# Patient Record
Sex: MALE | Race: WHITE | Employment: UNEMPLOYED
[De-identification: names, ages, dates, MRNs, and addresses within clinical notes are randomized per-mention and may not be internally consistent; named-entity substitution may affect disease eponyms.]

---

## 2018-08-01 ENCOUNTER — HOSPITAL ENCOUNTER (OUTPATIENT)
Dept: RADIATION ONCOLOGY | Facility: MEDICAL CENTER | Age: 63
Discharge: HOME | End: 2018-08-01
Payer: COMMERCIAL

## 2018-08-01 PROCEDURE — 99213 OFFICE O/P EST LOW 20 MIN: CPT | Performed by: RADIOLOGY

## 2018-08-08 PROCEDURE — 77290 THER RAD SIMULAJ FIELD CPLX: CPT | Performed by: RADIOLOGY

## 2018-08-08 PROCEDURE — 77334 RADIATION TREATMENT AID(S): CPT | Performed by: RADIOLOGY

## 2018-08-13 PROCEDURE — 77334 RADIATION TREATMENT AID(S): CPT | Performed by: RADIOLOGY

## 2018-08-13 PROCEDURE — 77300 RADIATION THERAPY DOSE PLAN: CPT | Performed by: RADIOLOGY

## 2018-08-13 PROCEDURE — 77295 3-D RADIOTHERAPY PLAN: CPT | Performed by: RADIOLOGY

## 2018-08-14 PROCEDURE — 77387 GUIDANCE FOR RADJ TX DLVR: CPT | Performed by: RADIOLOGY

## 2018-08-14 PROCEDURE — 77412 RADIATION TX DELIVERY LVL 3: CPT | Performed by: RADIOLOGY

## 2018-08-14 PROCEDURE — 77280 THER RAD SIMULAJ FIELD SMPL: CPT | Performed by: RADIOLOGY

## 2018-08-15 PROCEDURE — 77387 GUIDANCE FOR RADJ TX DLVR: CPT | Performed by: RADIOLOGY

## 2018-08-15 PROCEDURE — 77412 RADIATION TX DELIVERY LVL 3: CPT | Performed by: RADIOLOGY

## 2018-08-16 PROCEDURE — 77412 RADIATION TX DELIVERY LVL 3: CPT | Performed by: RADIOLOGY

## 2018-08-16 PROCEDURE — 77387 GUIDANCE FOR RADJ TX DLVR: CPT | Performed by: RADIOLOGY

## 2018-08-17 PROCEDURE — 77387 GUIDANCE FOR RADJ TX DLVR: CPT | Performed by: RADIOLOGY

## 2018-08-17 PROCEDURE — 77412 RADIATION TX DELIVERY LVL 3: CPT | Performed by: RADIOLOGY

## 2018-08-20 PROCEDURE — 77412 RADIATION TX DELIVERY LVL 3: CPT | Performed by: RADIOLOGY

## 2018-08-20 PROCEDURE — 77336 RADIATION PHYSICS CONSULT: CPT | Performed by: RADIOLOGY

## 2018-08-20 PROCEDURE — 77387 GUIDANCE FOR RADJ TX DLVR: CPT | Performed by: RADIOLOGY

## 2018-08-21 PROCEDURE — 77412 RADIATION TX DELIVERY LVL 3: CPT | Performed by: RADIOLOGY

## 2018-08-21 PROCEDURE — 77387 GUIDANCE FOR RADJ TX DLVR: CPT | Performed by: RADIOLOGY

## 2018-08-22 PROCEDURE — 77387 GUIDANCE FOR RADJ TX DLVR: CPT | Performed by: RADIOLOGY

## 2018-08-22 PROCEDURE — 77412 RADIATION TX DELIVERY LVL 3: CPT | Performed by: RADIOLOGY

## 2018-08-23 PROCEDURE — 77412 RADIATION TX DELIVERY LVL 3: CPT | Performed by: RADIOLOGY

## 2018-08-23 PROCEDURE — 77387 GUIDANCE FOR RADJ TX DLVR: CPT | Performed by: RADIOLOGY

## 2018-08-24 PROCEDURE — 77387 GUIDANCE FOR RADJ TX DLVR: CPT | Performed by: RADIOLOGY

## 2018-08-24 PROCEDURE — 77412 RADIATION TX DELIVERY LVL 3: CPT | Performed by: RADIOLOGY

## 2018-08-27 PROCEDURE — 77336 RADIATION PHYSICS CONSULT: CPT | Performed by: RADIOLOGY

## 2018-08-27 PROCEDURE — 77412 RADIATION TX DELIVERY LVL 3: CPT | Performed by: RADIOLOGY

## 2018-08-27 PROCEDURE — 77387 GUIDANCE FOR RADJ TX DLVR: CPT | Performed by: RADIOLOGY

## 2018-08-28 PROCEDURE — 77412 RADIATION TX DELIVERY LVL 3: CPT | Performed by: RADIOLOGY

## 2018-08-28 PROCEDURE — 77387 GUIDANCE FOR RADJ TX DLVR: CPT | Performed by: RADIOLOGY

## 2018-08-29 PROCEDURE — 77412 RADIATION TX DELIVERY LVL 3: CPT | Performed by: RADIOLOGY

## 2018-08-29 PROCEDURE — 77387 GUIDANCE FOR RADJ TX DLVR: CPT | Performed by: RADIOLOGY

## 2018-08-30 PROCEDURE — 77387 GUIDANCE FOR RADJ TX DLVR: CPT | Performed by: RADIOLOGY

## 2018-08-30 PROCEDURE — 77412 RADIATION TX DELIVERY LVL 3: CPT | Performed by: RADIOLOGY

## 2018-08-31 PROCEDURE — 77387 GUIDANCE FOR RADJ TX DLVR: CPT | Performed by: RADIOLOGY

## 2018-08-31 PROCEDURE — 77412 RADIATION TX DELIVERY LVL 3: CPT | Performed by: RADIOLOGY

## 2018-09-04 ENCOUNTER — HOSPITAL ENCOUNTER (OUTPATIENT)
Dept: RADIATION ONCOLOGY | Facility: MEDICAL CENTER | Age: 63
Discharge: HOME | End: 2018-09-04
Payer: COMMERCIAL

## 2018-09-04 PROCEDURE — 77412 RADIATION TX DELIVERY LVL 3: CPT | Performed by: RADIOLOGY

## 2018-09-04 PROCEDURE — 77387 GUIDANCE FOR RADJ TX DLVR: CPT | Performed by: RADIOLOGY

## 2018-09-04 PROCEDURE — 77336 RADIATION PHYSICS CONSULT: CPT | Performed by: RADIOLOGY

## 2018-09-05 PROCEDURE — 77387 GUIDANCE FOR RADJ TX DLVR: CPT | Performed by: RADIOLOGY

## 2018-09-05 PROCEDURE — 77412 RADIATION TX DELIVERY LVL 3: CPT | Performed by: RADIOLOGY

## 2018-09-06 PROCEDURE — 77412 RADIATION TX DELIVERY LVL 3: CPT | Performed by: RADIOLOGY

## 2018-09-06 PROCEDURE — 77387 GUIDANCE FOR RADJ TX DLVR: CPT | Performed by: RADIOLOGY

## 2018-09-07 PROCEDURE — 77412 RADIATION TX DELIVERY LVL 3: CPT | Performed by: RADIOLOGY

## 2018-09-07 PROCEDURE — 77387 GUIDANCE FOR RADJ TX DLVR: CPT | Performed by: RADIOLOGY

## 2018-09-10 PROCEDURE — 77412 RADIATION TX DELIVERY LVL 3: CPT | Performed by: RADIOLOGY

## 2018-09-10 PROCEDURE — 77387 GUIDANCE FOR RADJ TX DLVR: CPT | Performed by: RADIOLOGY

## 2018-09-11 PROCEDURE — 77387 GUIDANCE FOR RADJ TX DLVR: CPT | Performed by: RADIOLOGY

## 2018-09-11 PROCEDURE — 77412 RADIATION TX DELIVERY LVL 3: CPT | Performed by: RADIOLOGY

## 2018-09-11 PROCEDURE — 77336 RADIATION PHYSICS CONSULT: CPT | Performed by: RADIOLOGY

## 2018-09-12 PROCEDURE — 77412 RADIATION TX DELIVERY LVL 3: CPT | Performed by: RADIOLOGY

## 2018-09-12 PROCEDURE — 77387 GUIDANCE FOR RADJ TX DLVR: CPT | Performed by: RADIOLOGY

## 2018-09-13 PROCEDURE — 77412 RADIATION TX DELIVERY LVL 3: CPT | Performed by: RADIOLOGY

## 2018-09-13 PROCEDURE — 77387 GUIDANCE FOR RADJ TX DLVR: CPT | Performed by: RADIOLOGY

## 2018-09-14 PROCEDURE — 77387 GUIDANCE FOR RADJ TX DLVR: CPT | Performed by: RADIOLOGY

## 2018-09-14 PROCEDURE — 77412 RADIATION TX DELIVERY LVL 3: CPT | Performed by: RADIOLOGY

## 2018-09-17 PROCEDURE — 77387 GUIDANCE FOR RADJ TX DLVR: CPT | Performed by: RADIOLOGY

## 2018-09-17 PROCEDURE — 77412 RADIATION TX DELIVERY LVL 3: CPT | Performed by: RADIOLOGY

## 2018-09-18 PROCEDURE — 77387 GUIDANCE FOR RADJ TX DLVR: CPT | Performed by: RADIOLOGY

## 2018-09-18 PROCEDURE — 77336 RADIATION PHYSICS CONSULT: CPT | Performed by: RADIOLOGY

## 2018-09-18 PROCEDURE — 77412 RADIATION TX DELIVERY LVL 3: CPT | Performed by: RADIOLOGY

## 2019-01-21 PROBLEM — C34.32 PRIMARY MALIGNANT NEOPLASM OF LEFT LOWER LOBE OF LUNG (HCC): Status: ACTIVE | Noted: 2019-01-21

## 2019-01-22 ENCOUNTER — HOSPITAL ENCOUNTER (OUTPATIENT)
Dept: RADIATION ONCOLOGY | Facility: MEDICAL CENTER | Age: 64
Setting detail: THERAPIES SERIES
Discharge: HOME | End: 2019-01-22
Payer: COMMERCIAL

## 2019-01-22 VITALS
TEMPERATURE: 98.2 F | DIASTOLIC BLOOD PRESSURE: 71 MMHG | SYSTOLIC BLOOD PRESSURE: 114 MMHG | HEIGHT: 69 IN | WEIGHT: 163 LBS | HEART RATE: 101 BPM | BODY MASS INDEX: 24.14 KG/M2 | RESPIRATION RATE: 14 BRPM

## 2019-01-22 DIAGNOSIS — C34.32 PRIMARY MALIGNANT NEOPLASM OF LEFT LOWER LOBE OF LUNG (HCC): Primary | ICD-10-CM

## 2019-01-22 PROCEDURE — 99212 OFFICE O/P EST SF 10 MIN: CPT | Performed by: RADIOLOGY

## 2019-01-22 RX ORDER — ASCORBIC ACID 250 MG
250 TABLET ORAL
Status: ON HOLD | COMMUNITY
End: 2021-04-20 | Stop reason: ALTCHOICE

## 2019-01-22 RX ORDER — TRAMADOL HYDROCHLORIDE 50 MG/1
TABLET ORAL
Status: ON HOLD | COMMUNITY
Start: 2018-10-30 | End: 2021-04-20 | Stop reason: CLARIF

## 2019-01-22 RX ORDER — PANTOPRAZOLE SODIUM 40 MG/1
40 TABLET, DELAYED RELEASE ORAL
Status: ON HOLD | COMMUNITY
Start: 2019-01-08 | End: 2021-04-20 | Stop reason: SDUPTHER

## 2019-01-22 RX ORDER — FLUTICASONE PROPIONATE 50 MCG
2 SPRAY, SUSPENSION (ML) NASAL DAILY
COMMUNITY
Start: 2017-09-06

## 2019-01-22 RX ORDER — CLOPIDOGREL BISULFATE 75 MG/1
75 TABLET ORAL DAILY
COMMUNITY
Start: 2018-12-11

## 2019-01-22 RX ORDER — ACETAMINOPHEN 500 MG
500 TABLET ORAL 3 TIMES DAILY
COMMUNITY

## 2019-01-22 RX ORDER — CETIRIZINE HYDROCHLORIDE 10 MG/1
10 TABLET, CHEWABLE ORAL DAILY
COMMUNITY

## 2019-01-22 RX ORDER — CILOSTAZOL 50 MG/1
50 TABLET ORAL
Status: ON HOLD | COMMUNITY
End: 2021-04-20

## 2019-01-22 RX ORDER — GUAIFENESIN AND CODEINE PHOSPHATE 100; 10 MG/5ML; MG/5ML
5 SOLUTION ORAL
Status: ON HOLD | COMMUNITY
Start: 2019-01-02 | End: 2021-04-20

## 2019-05-16 ENCOUNTER — TELEPHONE (OUTPATIENT)
Dept: RADIATION ONCOLOGY | Facility: MEDICAL CENTER | Age: 64
End: 2019-05-16

## 2019-05-16 NOTE — TELEPHONE ENCOUNTER
Per pending list, I got results of Ct chest from [facility]. Report given to Dr Anna Michaud. Per his instructions I called Narinder to schedule follow up.  I spoke with his wife and scheduled follow up on 6/4/19 @ 3:15pm

## 2019-06-04 ENCOUNTER — HOSPITAL ENCOUNTER (OUTPATIENT)
Dept: RADIATION ONCOLOGY | Facility: MEDICAL CENTER | Age: 64
Discharge: HOME | End: 2019-06-04
Payer: COMMERCIAL

## 2019-06-04 VITALS
DIASTOLIC BLOOD PRESSURE: 70 MMHG | WEIGHT: 177 LBS | HEART RATE: 85 BPM | SYSTOLIC BLOOD PRESSURE: 105 MMHG | TEMPERATURE: 98.2 F | RESPIRATION RATE: 16 BRPM | BODY MASS INDEX: 26.22 KG/M2 | OXYGEN SATURATION: 95 % | HEIGHT: 69 IN

## 2019-06-04 PROCEDURE — 99212 OFFICE O/P EST SF 10 MIN: CPT | Performed by: RADIOLOGY

## 2019-06-04 RX ORDER — ALBUTEROL SULFATE 1.25 MG/3ML
1 SOLUTION RESPIRATORY (INHALATION) 2 TIMES DAILY
Status: ON HOLD | COMMUNITY
End: 2021-04-20 | Stop reason: CLARIF

## 2019-06-04 NOTE — PROGRESS NOTES
TABLET BY MOUTH EVERY 6 HOURS AS NEEDED FOR PAIN, Disp: , Rfl:     cilostazol (PLETAL) 50 MG tablet, Take 50 mg by mouth, Disp: , Rfl:     Immunizations:    Influenza status:    [x]   Current   []   Patient declined    Pneumococcal status:  [x]   Current  []   Patient declined    Smoking Status:    [] Smoker - PPD:   [x] Nonsmoker - Quit Date:   March 13 2018            [] Never a smoker           Cancer Screening:  Colonoscopy [x] Current [] Not current   [] Not current, but scheduled   [] NA  Mammogram [] Current [] Not current   [] Not current, but scheduled   [x] NA  Prostate [x] Current [] Not current   [] Not current, but scheduled   [] NA  PAP/Pelvic [] Current [] Not current   [] Not current, but scheduled   [x] NA  Skin  [] Current  [x] Not current   [] Not current, but scheduled   [] NA    Hormone Injection:  Lupron []   Last dose given:           Next dose due:   Eligard []   Last dose given:           Next dose due:   N/A:  [x]         FALLS RISK SCREEN  Instructions:  Assess the patient and enter the appropriate indicators that are present for fall risk identification. Total the numbers entered and assign a fall risk score from Table 2.  Reassess patient at a minimum every 12 weeks or with status change. Assessment   Date  6/4/2019   1. Mental Ability: confusion/cognitively impaired 0 (3)   2. Elimination Issues: incontinence, frequency 0 (3)   3. Ambulatory: use of assistive devices (walker, cane, off-loading devices),        attached to equipment (IV pole, oxygen) 0 (2)   4.  Sensory Limitations: dizziness, vertigo, impaired vision 0 (3)   5. Age less than 65                 0 (0)   6. Age 72 or greater 0 (1)   7. Medication: diuretics, strong analgesics, hypnotics, sedatives,        antihypertensive agents 0 (3)   8. Falls:  recent history of falls within the last 3 months (not to include slipping or        tripping) 0 (7)   TOTAL 0  If score of 4 or greater was education given?  No

## 2019-06-04 NOTE — PROGRESS NOTES
Martha Pennington M.D. Chitra Jerry. Phan Jackson, Ph.D., M.D., Anibal Kaba M.D. Parish Nowak, Ph.D., M.KORIN. Bairon Jones M.D. Date of Service: 2019    Location:  [facility],   [address] [place] [address], [facility]   [facility] FOLLOW UP    Patient ID:   Jo-Ann Ling  : 1955   MRN: [id number]    DIAGNOSIS:  Cancer Staging  Primary malignant neoplasm of left lower lobe of lung [facility])  Staging form: Lung, AJCC 8th Edition  - Pathologic stage from 1/3/2018: Stage IIB (pT1c, pN1, cM0) - Signed by Martha Pennington MD on 2019  Staging comments: Left lower lobe FNA 1/3/2018 positive grade 3 non-small cell carcinoma. Robotic left lower lobe lobectomy with mediastinal node dissection 3/13/2018 revealed 2.5 cm grade 3 squamous cell carcinoma, one peribronchial lymph nodes positive measuring 3.5 cm with extensive extracapsular extension with invasion of the bronchial/vascular invasion through the bronchial cartilage and into the subepithelial tissue without involving the mucosa, negative margins, evidence of lymphovascular invasion, 5 other peribronchial nodes negative, one/5 segmental nodes positive, one subsegmental node negative, one level X nodes negative, one hilar node negative, one left inferior pulmonary lymph nodes negative. Adjuvant carboplatin/Taxol followed by radiation to the left hilar tumor bed 5000 cGy between 2018 and 2018. INTERVAL HISTORY:   Jo-Ann Ling is a 59 y.o. male who underwent adjuvant radiation therapy following left lower lobectomy. A total dose delivery to the left hilar bed of 5000 cGy was completed by 2018. He had adjuvant carboplatin and Taxol followed by external beam radiation. Last seen in radiation oncology clinic 2019 he had a cough producing clear sputum that did not respond to antibiotics.  Prednisone was provided for presumptive radiation pneumonitis. He is on a tapering dose, now down to 10 mg per day. Imaging studies of the chest were most recently accomplished on May 8, 2019. The CT scan report from the [facility] clinic describes a comparison to studies performed February 7, 2019. This most recent study described volume loss in the left lobe of the lung to be stable guarding. Patchy densities in the right lower lobe had cleared, right middle lobe changes were stable, unrelated to the radiation treatment. Chronic changes in his lungs were also identified; these images are not available for my review. Overall he has a sense of well-being, occasional epistaxis which he believes is secondary to seasonal allergies and being treated with OTC medications. MEDICATIONS:    Current Outpatient Medications:     albuterol (ACCUNEB) 1.25 MG/3ML nebulizer solution, Inhale 1 ampule into the lungs 2 times daily, Disp: , Rfl:     B Complex Vitamins (VITAMIN B COMPLEX PO), Take 1 capsule by mouth, Disp: , Rfl:     cetirizine (ZYRTEC) 10 MG chewable tablet, Take 10 mg by mouth, Disp: , Rfl:     clopidogrel (PLAVIX) 75 MG tablet, TAKE 1 TABLET (75 MG TOTAL) BY MOUTH DAILY. , Disp: , Rfl:     Ferrous Fum-Iron Polysacch 162-115.2 MG CAPS, Take 1 capsule by mouth, Disp: , Rfl:     Multiple Vitamins-Minerals (MULTIVITAL PO), Take 1 tablet by mouth, Disp: , Rfl:     pantoprazole (PROTONIX) 40 MG tablet, TAKE 1 TABLET BY MOUTH EVERY DAY, Disp: , Rfl:     Omega-3 Fatty Acids (FISH OIL PO), Take 2 g by mouth, Disp: , Rfl:     acetaminophen (TYLENOL) 500 MG tablet, Take 500 mg by mouth, Disp: , Rfl:     ascorbic acid (VITAMIN C) 250 MG tablet, Take 250 mg by mouth, Disp: , Rfl:     guaiFENesin-codeine (TUSSI-ORGANIDIN NR) 100-10 MG/5ML syrup, Take 5 mLs by mouth. ., Disp: , Rfl:     fluticasone (FLONASE) 50 MCG/ACT nasal spray, 2 sprays by Nasal route, Disp: , Rfl:     glycopyrrolate-formoterol (BEVESPI AEROSPHERE) 9-4.8 MCG/ACT AERO, Inhale 2 puffs into the lungs, Disp: , Rfl:     traMADol (ULTRAM) 50 MG tablet, TAKE 1 TABLET BY MOUTH EVERY 6 HOURS AS NEEDED FOR PAIN, Disp: , Rfl:     cilostazol (PLETAL) 50 MG tablet, Take 50 mg by mouth, Disp: , Rfl:     ALLERGIES:  Allergies   Allergen Reactions    Adhesive Tape Other (See Comments)     redness    Sulfa Antibiotics      unsure       IMMUNIZATIONS:  Immunization History   Administered Date(s) Administered    Influenza Virus Vaccine 10/29/2018    Pneumococcal 13-valent Conjugate ([id number]) 2018    Pneumococcal Polysaccharide ([id number]) 2016       SMOKING:  Social History     Tobacco Use   Smoking Status Former Smoker    Last attempt to quit: 3/12/2018    Years since quittin.2   Smokeless Tobacco Never Used     Counseling given: Not Answered          REVIEW OF SYSTEMS:    Review of Systems unremarkable for progressive shortness of breath, productive cough, hemoptysis, chest pain, change in bowel or bladder habits, bone discomfort or pain. The remaining oncologic review of systems was noted and entered into the medical record after my review. PHYSICAL EXAMINATION:  CHAPERONE: Declined  ECO Asymptomatic  VITAL SIGNS: /70   Pulse 85   Temp 98.2 °F (36.8 °C) (Oral)   Resp 16   Ht 5' 8.5\" (1.74 m)   Wt 177 lb (80.3 kg)   SpO2 95%   BMI 26.52 kg/m²   Wt Readings from Last 3 Encounters:   19 177 lb (80.3 kg)   19 163 lb (73.9 kg)     Physical Exam was done in the presence of his wife. There is no lymphadenopathy in the cervical, supraclavicular, infraclavicular regions, his right lung is clear to auscultation, left lung is clear with somewhat distant breath sounds. Good skin turgor is present over the chest wall, heart regular rate and rhythm, abdomen is soft with brisk bowel sounds, no peripheral edema is noted, no tenderness to percussion over spine, he was seen to ambulate without unilateral weakness.     ASSESSMENT AND PLAN:  No reported radiographic evidence of progressive disease now approaching 9 months out of adjuvant radiation treatment, clinically doing well. He is returning to Dr. Heloise Bloch in November of this year has close follow-up in pulmonary clinic as well. I offered follow-up in our clinic in 9 months time or earlier should new problems arise. Electronically signed by Amy Schmidt MD on 6/4/2019 at 3:46 PM  [address]      Medications Prescribed:   New Prescriptions    No medications on file       Orders: No orders of the defined types were placed in this encounter.

## 2020-01-14 ENCOUNTER — TELEPHONE (OUTPATIENT)
Dept: RADIATION ONCOLOGY | Facility: MEDICAL CENTER | Age: 65
End: 2020-01-14

## 2020-01-14 NOTE — TELEPHONE ENCOUNTER
Alma Zamudio called to cancel his follow up on 3/24/2020.  He states he does not want to reschedule , he will follow with his medical oncologist.

## 2021-04-19 ENCOUNTER — APPOINTMENT (OUTPATIENT)
Dept: CT IMAGING | Age: 66
DRG: 378 | End: 2021-04-19
Payer: MEDICARE

## 2021-04-19 ENCOUNTER — HOSPITAL ENCOUNTER (INPATIENT)
Age: 66
LOS: 1 days | Discharge: HOME | DRG: 378 | End: 2021-04-20
Attending: EMERGENCY MEDICINE | Admitting: INTERNAL MEDICINE
Payer: MEDICARE

## 2021-04-19 DIAGNOSIS — N28.89 KIDNEY MASS: ICD-10-CM

## 2021-04-19 DIAGNOSIS — E87.1 HYPONATREMIA: ICD-10-CM

## 2021-04-19 DIAGNOSIS — K62.5 RECTAL BLEEDING: Primary | ICD-10-CM

## 2021-04-19 PROBLEM — K21.9 CHRONIC GERD: Status: ACTIVE | Noted: 2021-04-19

## 2021-04-19 PROBLEM — J44.9 COPD WITHOUT EXACERBATION (HCC): Status: ACTIVE | Noted: 2021-04-19

## 2021-04-19 PROBLEM — K92.2 GI BLEED: Status: ACTIVE | Noted: 2021-04-19

## 2021-04-19 PROBLEM — D50.9 MICROCYTIC ANEMIA: Status: ACTIVE | Noted: 2021-04-19

## 2021-04-19 LAB
ABSOLUTE EOS #: 0.07 K/UL (ref 0–0.44)
ABSOLUTE IMMATURE GRANULOCYTE: 0.06 K/UL (ref 0–0.3)
ABSOLUTE LYMPH #: 0.94 K/UL (ref 1.1–3.7)
ABSOLUTE MONO #: 0.81 K/UL (ref 0.1–1.2)
ALBUMIN SERPL-MCNC: 3.8 G/DL (ref 3.5–5.2)
ALBUMIN/GLOBULIN RATIO: ABNORMAL (ref 1–2.5)
ALP BLD-CCNC: 82 U/L (ref 40–129)
ALT SERPL-CCNC: 12 U/L (ref 5–41)
ANION GAP SERPL CALCULATED.3IONS-SCNC: 15 MMOL/L (ref 9–17)
AST SERPL-CCNC: 13 U/L
BASOPHILS # BLD: 0 % (ref 0–2)
BASOPHILS ABSOLUTE: 0.05 K/UL (ref 0–0.2)
BILIRUB SERPL-MCNC: 0.21 MG/DL (ref 0.3–1.2)
BUN BLDV-MCNC: 13 MG/DL (ref 8–23)
BUN/CREAT BLD: 19 (ref 9–20)
CALCIUM SERPL-MCNC: 9.3 MG/DL (ref 8.6–10.4)
CHLORIDE BLD-SCNC: 93 MMOL/L (ref 98–107)
CO2: 20 MMOL/L (ref 20–31)
CREAT SERPL-MCNC: 0.67 MG/DL (ref 0.7–1.2)
DATE, STOOL #1: NORMAL
DATE, STOOL #2: NORMAL
DATE, STOOL #3: NORMAL
DIFFERENTIAL TYPE: ABNORMAL
EOSINOPHILS RELATIVE PERCENT: 1 % (ref 1–4)
GFR AFRICAN AMERICAN: >60 ML/MIN
GFR NON-AFRICAN AMERICAN: >60 ML/MIN
GFR SERPL CREATININE-BSD FRML MDRD: ABNORMAL ML/MIN/{1.73_M2}
GFR SERPL CREATININE-BSD FRML MDRD: ABNORMAL ML/MIN/{1.73_M2}
GLUCOSE BLD-MCNC: 107 MG/DL (ref 70–99)
HCT VFR BLD CALC: 32.8 % (ref 40.7–50.3)
HEMOCCULT SP1 STL QL: NEGATIVE
HEMOCCULT SP2 STL QL: NORMAL
HEMOCCULT SP3 STL QL: NORMAL
HEMOGLOBIN: 9.8 G/DL (ref 13–17)
IMMATURE GRANULOCYTES: 1 %
LACTIC ACID: 0.7 MMOL/L (ref 0.5–2.2)
LIPASE: 19 U/L (ref 13–60)
LYMPHOCYTES # BLD: 8 % (ref 24–43)
MCH RBC QN AUTO: 22.5 PG (ref 25.2–33.5)
MCHC RBC AUTO-ENTMCNC: 29.9 G/DL (ref 28.4–34.8)
MCV RBC AUTO: 75.4 FL (ref 82.6–102.9)
MONOCYTES # BLD: 7 % (ref 3–12)
NRBC AUTOMATED: 0 PER 100 WBC
PDW BLD-RTO: 15.4 % (ref 11.8–14.4)
PLATELET # BLD: 404 K/UL (ref 138–453)
PLATELET ESTIMATE: ABNORMAL
PMV BLD AUTO: 8.2 FL (ref 8.1–13.5)
POTASSIUM SERPL-SCNC: 4.2 MMOL/L (ref 3.7–5.3)
RBC # BLD: 4.35 M/UL (ref 4.21–5.77)
RBC # BLD: ABNORMAL 10*6/UL
SEG NEUTROPHILS: 83 % (ref 36–65)
SEGMENTED NEUTROPHILS ABSOLUTE COUNT: 9.87 K/UL (ref 1.5–8.1)
SERUM OSMOLALITY: 268 MOSM/KG (ref 282–298)
SODIUM BLD-SCNC: 128 MMOL/L (ref 135–144)
TIME, STOOL #1: 1429
TIME, STOOL #2: NORMAL
TIME, STOOL #3: NORMAL
TOTAL PROTEIN: 7 G/DL (ref 6.4–8.3)
TROPONIN INTERP: NORMAL
TROPONIN T: NORMAL NG/ML
TROPONIN, HIGH SENSITIVITY: 16 NG/L (ref 0–22)
WBC # BLD: 11.8 K/UL (ref 3.5–11.3)
WBC # BLD: ABNORMAL 10*3/UL

## 2021-04-19 PROCEDURE — G0378 HOSPITAL OBSERVATION PER HR: HCPCS

## 2021-04-19 PROCEDURE — 99223 1ST HOSP IP/OBS HIGH 75: CPT | Performed by: INTERNAL MEDICINE

## 2021-04-19 PROCEDURE — 93005 ELECTROCARDIOGRAM TRACING: CPT | Performed by: EMERGENCY MEDICINE

## 2021-04-19 PROCEDURE — C9113 INJ PANTOPRAZOLE SODIUM, VIA: HCPCS | Performed by: EMERGENCY MEDICINE

## 2021-04-19 PROCEDURE — 2060000000 HC ICU INTERMEDIATE R&B

## 2021-04-19 PROCEDURE — C9113 INJ PANTOPRAZOLE SODIUM, VIA: HCPCS | Performed by: INTERNAL MEDICINE

## 2021-04-19 PROCEDURE — 85025 COMPLETE CBC W/AUTO DIFF WBC: CPT

## 2021-04-19 PROCEDURE — 84484 ASSAY OF TROPONIN QUANT: CPT

## 2021-04-19 PROCEDURE — 74177 CT ABD & PELVIS W/CONTRAST: CPT

## 2021-04-19 PROCEDURE — 83690 ASSAY OF LIPASE: CPT

## 2021-04-19 PROCEDURE — 96376 TX/PRO/DX INJ SAME DRUG ADON: CPT

## 2021-04-19 PROCEDURE — 83930 ASSAY OF BLOOD OSMOLALITY: CPT

## 2021-04-19 PROCEDURE — 82272 OCCULT BLD FECES 1-3 TESTS: CPT

## 2021-04-19 PROCEDURE — 80053 COMPREHEN METABOLIC PANEL: CPT

## 2021-04-19 PROCEDURE — 6360000002 HC RX W HCPCS: Performed by: EMERGENCY MEDICINE

## 2021-04-19 PROCEDURE — 83605 ASSAY OF LACTIC ACID: CPT

## 2021-04-19 PROCEDURE — 99283 EMERGENCY DEPT VISIT LOW MDM: CPT

## 2021-04-19 PROCEDURE — 85014 HEMATOCRIT: CPT

## 2021-04-19 PROCEDURE — 36415 COLL VENOUS BLD VENIPUNCTURE: CPT

## 2021-04-19 PROCEDURE — 71260 CT THORAX DX C+: CPT

## 2021-04-19 PROCEDURE — 6360000004 HC RX CONTRAST MEDICATION: Performed by: EMERGENCY MEDICINE

## 2021-04-19 PROCEDURE — 83935 ASSAY OF URINE OSMOLALITY: CPT

## 2021-04-19 PROCEDURE — 6360000002 HC RX W HCPCS: Performed by: INTERNAL MEDICINE

## 2021-04-19 PROCEDURE — 96374 THER/PROPH/DIAG INJ IV PUSH: CPT

## 2021-04-19 PROCEDURE — 2580000003 HC RX 258: Performed by: INTERNAL MEDICINE

## 2021-04-19 PROCEDURE — 6370000000 HC RX 637 (ALT 250 FOR IP): Performed by: EMERGENCY MEDICINE

## 2021-04-19 PROCEDURE — 6370000000 HC RX 637 (ALT 250 FOR IP): Performed by: INTERNAL MEDICINE

## 2021-04-19 PROCEDURE — 2580000003 HC RX 258: Performed by: EMERGENCY MEDICINE

## 2021-04-19 PROCEDURE — 85018 HEMOGLOBIN: CPT

## 2021-04-19 RX ORDER — HYDROXYCHLOROQUINE SULFATE 200 MG/1
200 TABLET, FILM COATED ORAL 2 TIMES DAILY
COMMUNITY

## 2021-04-19 RX ORDER — ONDANSETRON 2 MG/ML
4 INJECTION INTRAMUSCULAR; INTRAVENOUS EVERY 6 HOURS PRN
Status: DISCONTINUED | OUTPATIENT
Start: 2021-04-19 | End: 2021-04-20 | Stop reason: HOSPADM

## 2021-04-19 RX ORDER — SODIUM CHLORIDE 9 MG/ML
25 INJECTION, SOLUTION INTRAVENOUS PRN
Status: DISCONTINUED | OUTPATIENT
Start: 2021-04-19 | End: 2021-04-20 | Stop reason: HOSPADM

## 2021-04-19 RX ORDER — HYDROXYCHLOROQUINE SULFATE 200 MG/1
200 TABLET, FILM COATED ORAL DAILY
Status: DISCONTINUED | OUTPATIENT
Start: 2021-04-20 | End: 2021-04-20 | Stop reason: HOSPADM

## 2021-04-19 RX ORDER — PROMETHAZINE HYDROCHLORIDE 12.5 MG/1
12.5 TABLET ORAL EVERY 6 HOURS PRN
Status: DISCONTINUED | OUTPATIENT
Start: 2021-04-19 | End: 2021-04-20 | Stop reason: HOSPADM

## 2021-04-19 RX ORDER — SODIUM CHLORIDE 0.9 % (FLUSH) 0.9 %
10 SYRINGE (ML) INJECTION PRN
Status: DISCONTINUED | OUTPATIENT
Start: 2021-04-19 | End: 2021-04-19 | Stop reason: SDUPTHER

## 2021-04-19 RX ORDER — ALBUTEROL SULFATE 2.5 MG/3ML
2.5 SOLUTION RESPIRATORY (INHALATION)
Status: DISCONTINUED | OUTPATIENT
Start: 2021-04-19 | End: 2021-04-20 | Stop reason: HOSPADM

## 2021-04-19 RX ORDER — GUAIFENESIN 400 MG/1
400 TABLET ORAL 4 TIMES DAILY PRN
COMMUNITY

## 2021-04-19 RX ORDER — SODIUM CHLORIDE 0.9 % (FLUSH) 0.9 %
5-40 SYRINGE (ML) INJECTION EVERY 12 HOURS SCHEDULED
Status: DISCONTINUED | OUTPATIENT
Start: 2021-04-19 | End: 2021-04-20 | Stop reason: HOSPADM

## 2021-04-19 RX ORDER — FAMOTIDINE 20 MG/1
20 TABLET, FILM COATED ORAL NIGHTLY PRN
Status: ON HOLD | COMMUNITY
End: 2021-04-20 | Stop reason: DRUGHIGH

## 2021-04-19 RX ORDER — SODIUM CHLORIDE 9 MG/ML
10 INJECTION INTRAVENOUS EVERY 12 HOURS
Status: DISCONTINUED | OUTPATIENT
Start: 2021-04-19 | End: 2021-04-20 | Stop reason: HOSPADM

## 2021-04-19 RX ORDER — 0.9 % SODIUM CHLORIDE 0.9 %
80 INTRAVENOUS SOLUTION INTRAVENOUS ONCE
Status: COMPLETED | OUTPATIENT
Start: 2021-04-19 | End: 2021-04-19

## 2021-04-19 RX ORDER — MULTIVIT WITH MINERALS/LUTEIN
250 TABLET ORAL DAILY
Status: DISCONTINUED | OUTPATIENT
Start: 2021-04-20 | End: 2021-04-20 | Stop reason: HOSPADM

## 2021-04-19 RX ORDER — SODIUM CHLORIDE 0.9 % (FLUSH) 0.9 %
5-40 SYRINGE (ML) INJECTION PRN
Status: DISCONTINUED | OUTPATIENT
Start: 2021-04-19 | End: 2021-04-20 | Stop reason: HOSPADM

## 2021-04-19 RX ORDER — ACETAMINOPHEN 500 MG
500 TABLET ORAL EVERY 6 HOURS PRN
Status: DISCONTINUED | OUTPATIENT
Start: 2021-04-19 | End: 2021-04-19 | Stop reason: SDUPTHER

## 2021-04-19 RX ORDER — FLUTICASONE PROPIONATE 50 MCG
2 SPRAY, SUSPENSION (ML) NASAL DAILY
Status: DISCONTINUED | OUTPATIENT
Start: 2021-04-20 | End: 2021-04-20 | Stop reason: HOSPADM

## 2021-04-19 RX ORDER — ACETAMINOPHEN 325 MG/1
650 TABLET ORAL ONCE
Status: COMPLETED | OUTPATIENT
Start: 2021-04-19 | End: 2021-04-19

## 2021-04-19 RX ORDER — ACETAMINOPHEN 650 MG/1
650 SUPPOSITORY RECTAL EVERY 6 HOURS PRN
Status: DISCONTINUED | OUTPATIENT
Start: 2021-04-19 | End: 2021-04-20 | Stop reason: HOSPADM

## 2021-04-19 RX ORDER — CODEINE PHOSPHATE AND GUAIFENESIN 10; 100 MG/5ML; MG/5ML
5 SOLUTION ORAL EVERY 4 HOURS PRN
Status: DISCONTINUED | OUTPATIENT
Start: 2021-04-19 | End: 2021-04-20 | Stop reason: HOSPADM

## 2021-04-19 RX ORDER — CETIRIZINE HYDROCHLORIDE 10 MG/1
10 TABLET ORAL DAILY
Status: DISCONTINUED | OUTPATIENT
Start: 2021-04-20 | End: 2021-04-20 | Stop reason: HOSPADM

## 2021-04-19 RX ORDER — TRAMADOL HYDROCHLORIDE 50 MG/1
50 TABLET ORAL EVERY 6 HOURS PRN
Status: DISCONTINUED | OUTPATIENT
Start: 2021-04-19 | End: 2021-04-20

## 2021-04-19 RX ORDER — ACETAMINOPHEN 325 MG/1
650 TABLET ORAL EVERY 6 HOURS PRN
Status: DISCONTINUED | OUTPATIENT
Start: 2021-04-19 | End: 2021-04-20 | Stop reason: HOSPADM

## 2021-04-19 RX ORDER — SODIUM CHLORIDE 9 MG/ML
INJECTION, SOLUTION INTRAVENOUS CONTINUOUS
Status: DISCONTINUED | OUTPATIENT
Start: 2021-04-19 | End: 2021-04-20 | Stop reason: HOSPADM

## 2021-04-19 RX ORDER — PANTOPRAZOLE SODIUM 40 MG/10ML
40 INJECTION, POWDER, LYOPHILIZED, FOR SOLUTION INTRAVENOUS EVERY 12 HOURS
Status: DISCONTINUED | OUTPATIENT
Start: 2021-04-19 | End: 2021-04-20 | Stop reason: HOSPADM

## 2021-04-19 RX ORDER — SODIUM CHLORIDE 9 MG/ML
INJECTION, SOLUTION INTRAVENOUS CONTINUOUS
Status: DISCONTINUED | OUTPATIENT
Start: 2021-04-19 | End: 2021-04-19 | Stop reason: SDUPTHER

## 2021-04-19 RX ORDER — PANTOPRAZOLE SODIUM 40 MG/10ML
40 INJECTION, POWDER, LYOPHILIZED, FOR SOLUTION INTRAVENOUS ONCE
Status: COMPLETED | OUTPATIENT
Start: 2021-04-19 | End: 2021-04-19

## 2021-04-19 RX ORDER — B-COMPLEX WITH VITAMIN C
1 TABLET ORAL DAILY
Status: DISCONTINUED | OUTPATIENT
Start: 2021-04-20 | End: 2021-04-20 | Stop reason: RX

## 2021-04-19 RX ADMIN — TRAMADOL HYDROCHLORIDE 50 MG: 50 TABLET, FILM COATED ORAL at 22:55

## 2021-04-19 RX ADMIN — SODIUM CHLORIDE, PRESERVATIVE FREE 10 ML: 5 INJECTION INTRAVENOUS at 15:26

## 2021-04-19 RX ADMIN — ACETAMINOPHEN 650 MG: 325 TABLET ORAL at 18:04

## 2021-04-19 RX ADMIN — SODIUM CHLORIDE, PRESERVATIVE FREE 10 ML: 5 INJECTION INTRAVENOUS at 22:55

## 2021-04-19 RX ADMIN — IOPAMIDOL 75 ML: 755 INJECTION, SOLUTION INTRAVENOUS at 15:25

## 2021-04-19 RX ADMIN — PANTOPRAZOLE SODIUM 40 MG: 40 INJECTION, POWDER, FOR SOLUTION INTRAVENOUS at 15:49

## 2021-04-19 RX ADMIN — SODIUM CHLORIDE: 9 INJECTION, SOLUTION INTRAVENOUS at 22:56

## 2021-04-19 RX ADMIN — SODIUM CHLORIDE, PRESERVATIVE FREE 10 ML: 5 INJECTION INTRAVENOUS at 15:49

## 2021-04-19 RX ADMIN — SODIUM CHLORIDE 80 ML: 9 INJECTION, SOLUTION INTRAVENOUS at 15:25

## 2021-04-19 RX ADMIN — PANTOPRAZOLE SODIUM 40 MG: 40 INJECTION, POWDER, FOR SOLUTION INTRAVENOUS at 22:54

## 2021-04-19 RX ADMIN — SODIUM CHLORIDE: 9 INJECTION, SOLUTION INTRAVENOUS at 15:50

## 2021-04-19 RX ADMIN — SODIUM CHLORIDE, PRESERVATIVE FREE 10 ML: 5 INJECTION INTRAVENOUS at 23:06

## 2021-04-19 RX ADMIN — GUAIFENESIN AND CODEINE PHOSPHATE 5 ML: 10; 100 LIQUID ORAL at 22:55

## 2021-04-19 ASSESSMENT — ENCOUNTER SYMPTOMS
BACK PAIN: 0
ABDOMINAL PAIN: 1
BLOOD IN STOOL: 1
EYE REDNESS: 0
SHORTNESS OF BREATH: 0
TROUBLE SWALLOWING: 0
VOMITING: 1

## 2021-04-19 ASSESSMENT — PAIN DESCRIPTION - DESCRIPTORS: DESCRIPTORS: BURNING

## 2021-04-19 ASSESSMENT — PAIN SCALES - GENERAL
PAINLEVEL_OUTOF10: 4
PAINLEVEL_OUTOF10: 2

## 2021-04-19 ASSESSMENT — PAIN DESCRIPTION - LOCATION: LOCATION: ABDOMEN

## 2021-04-19 NOTE — H&P
[facility]  Office: [address], DO, Mignon Rivera, DO, Valdez Campos, DO, Marlo Kanner Edmund, DO, Bonilla Garvin MD, Sia Esteves MD, Evelio Ferrer MD, Sophia Landeros MD, Zenaida Melvin MD, Wanda Abdi MD, Raine Stevens MD, Sonny Prieto MD, Danay Mcintosh DO, Gibran Luna MD, Su Leary DO, Savage Burns MD,  Carley Coreas DO, Pili Hung MD, Justin Kauffman MD, Felicita Benítez MD, Rosalino Adames MD, Yosef [facility], [facility], CNP, Adam Bonner, CNP, Asaf Deluna, CNS, Gordon Leal, CNP, Tracy Rasmussen, CNP, Hillary Vincent, CNP, Mateo Webb, CNP, Herber Smith, CNP, Teodoro Nagy PA-C, Quincy Omalley, [facility], Otoniel Faustin, CNP, Orville Thakkar, CNP, Parker Harris, CNP, To Wallace, CNP, Renata Gruber, CNP, Horacio Boyce, [facility]    HISTORY AND PHYSICAL EXAMINATION            Date:   4/19/2021  Patient name:  Guille Contreras  Date of admission:  4/19/2021  2:04 PM  MRN:   [id number]  Account:  [de-identified]  YOB: 1955  PCP:    JUANITA Velasco CNP  Room:   [place] [address]  Code Status:    No Order    Chief Complaint:     Chief Complaint   Patient presents with    Hematemesis     upcoming scope    Rectal Bleeding     10 pounds weight loss, unable to sleep lying down       History Obtained From:     patient    History of Present Illness:     Guille Contreras is a 77 y.o. Non-/non  male who presents with Hematemesis (upcoming scope) and Rectal Bleeding (10 pounds weight loss, unable to sleep lying down)   and is admitted to the hospital for the management of GI bleed. This is a 63-year-old male with longstanding history of gastroesophageal reflux disease that has been steadily worsening since this past January. Has had increasing pain with swallowing as well as increasing reflux symptoms.   He has a burning sensation in the epigastric region extending up into the chest and has had changes in his voice over the last month. He has had difficulty eating due to his symptoms and has had unintentional weight loss. He has had melena associated with his symptoms as well as intermittent hematemesis. He denies any fevers or chills, anginal chest pain or other acute symptoms. He continues to have BMs as well as passing gas, denies any abdominal distention or other associated symptoms. His symptoms worsen after eating as well as when laying down at night. Past Medical History:     Past Medical History:   Diagnosis Date    BPH (benign prostatic hyperplasia)     [facility]     COPD (chronic obstructive pulmonary disease) (HCC)     GERD (gastroesophageal reflux disease)     Peptic ulcer disease     [facility]     Vascular abnormality     vascular constriction        Past Surgical History:     Past Surgical History:   Procedure Laterality Date    BRONCHOSCOPY      COLONOSCOPY      KNEE SURGERY      LUNG REMOVAL, PARTIAL Left     Left lower lobe    SINUS SURGERY      TONSILLECTOMY          Medications Prior to Admission:     Prior to Admission medications    Medication Sig Start Date End Date Taking?  Authorizing Provider   hydroxychloroquine (PLAQUENIL) 200 MG tablet Take by mouth daily   Yes Historical Provider, MD   famotidine (PEPCID) 20 MG tablet Take 20 mg by mouth nightly as needed   Yes Historical Provider, MD   guaiFENesin 400 MG tablet Take 400 mg by mouth 4 times daily as needed for Cough   Yes Historical Provider, MD   albuterol (ACCUNEB) 1.25 MG/3ML nebulizer solution Inhale 1 ampule into the lungs 2 times daily   Yes Historical Provider, MD   acetaminophen (TYLENOL) 500 MG tablet Take 500 mg by mouth   Yes Historical Provider, MD   ascorbic acid (VITAMIN C) 250 MG tablet Take 250 mg by mouth   Yes Historical Provider, MD   B Complex Vitamins (VITAMIN B COMPLEX PO) Take 1 capsule by mouth   Yes Historical Provider, MD negative for nausea, vomiting, diarrhea, constipation, change in bowel habits, positive for difficulty swallowing, melena, hematemesis and epigastric abdominal pain   GENITOURINARY:  negative for difficulty of urination, burning with urination, frequency   INTEGUMENT:  negative for rash, skin lesions, easy bruising   HEMATOLOGIC/LYMPHATIC:  negative for swelling/edema   ALLERGIC/IMMUNOLOGIC:  negative for urticaria , itching  ENDOCRINE:  negative increase in drinking, increase in urination, hot or cold intolerance  MUSCULOSKELETAL:  negative joint pains, muscle aches, swelling of joints  NEUROLOGICAL:  negative for headaches, dizziness, lightheadedness, numbness, pain, tingling extremities  BEHAVIOR/PSYCH:  negative for depression, anxiety    Physical Exam:   /71   Pulse 112   Temp 99 °F (37.2 °C) (Oral)   Resp (!) 32   Ht 5' 8\" (1.727 m)   Wt 160 lb (72.6 kg)   SpO2 93%   BMI 24.33 kg/m²   Temp (24hrs), Av °F (37.2 °C), Min:99 °F (37.2 °C), Max:99 °F (37.2 °C)    No results for input(s): POCGLU in the last 72 hours. No intake or output data in the 24 hours ending 21 1851    General Appearance:  alert, well appearing, and in no acute distress  Mental status: oriented to person, place, and time  Head:  normocephalic, atraumatic  Eye: no icterus, redness, pupils equal and reactive, extraocular eye movements intact, conjunctiva clear  Ear: normal external ear, no discharge, hearing intact  Nose:  no drainage noted  Mouth: mucous membranes moist  Neck: supple, no carotid bruits, thyroid not palpable  Lungs: Bilateral equal air entry, clear to auscultation, no wheezing, rales or rhonchi, normal effort  Cardiovascular: normal rate, regular rhythm, no murmur, gallop, rub.   Abdomen: Soft, epigastric tenderness, nondistended, normal bowel sounds, no hepatomegaly or splenomegaly  Neurologic: There are no new focal motor or sensory deficits, normal muscle tone and bulk, no abnormal sensation, normal speech, cranial nerves II through XII grossly intact  Skin: No gross lesions, rashes, bruising or bleeding on exposed skin area  Extremities:  peripheral pulses palpable, no pedal edema or calf pain with palpation  Psych: normal affect     Investigations:      Laboratory Testing:  Recent Results (from the past 24 hour(s))   BLOOD OCCULT STOOL #1    Collection Time: 04/19/21  2:29 PM   Result Value Ref Range    Occult Blood, Stool #1 NEGATIVE NEGATIVE    Date, Stool #1 41,921     Time, Stool #1 1,429     Occult Blood, Stool #2 NOT REPORTED NEGATIVE    Date, Stool #2 NOT REPORTED     Time, Stool #2 NOT REPORTED     Occult Blood, Stool #3 NOT REPORTED NEGATIVE    Date, Stool #3 NOT REPORTED     Time, Stool #3 NOT REPORTED    CBC with DIFF    Collection Time: 04/19/21  2:43 PM   Result Value Ref Range    WBC 11.8 (H) 3.5 - 11.3 k/uL    RBC 4.35 4.21 - 5.77 m/uL    Hemoglobin 9.8 (L) 13.0 - 17.0 g/dL    Hematocrit 32.8 (L) 40.7 - 50.3 %    MCV 75.4 (L) 82.6 - 102.9 fL    MCH 22.5 (L) 25.2 - 33.5 pg    MCHC 29.9 28.4 - 34.8 g/dL    RDW 15.4 (H) 11.8 - 14.4 %    Platelets 751 864 - 143 k/uL    MPV 8.2 8.1 - 13.5 fL    NRBC Automated 0.0 0.0 per 100 WBC    Differential Type NOT REPORTED     Seg Neutrophils 83 (H) 36 - 65 %    Lymphocytes 8 (L) 24 - 43 %    Monocytes 7 3 - 12 %    Eosinophils % 1 1 - 4 %    Basophils 0 0 - 2 %    Immature Granulocytes 1 (H) 0 %    Segs Absolute 9.87 (H) 1.50 - 8.10 k/uL    Absolute Lymph # 0.94 (L) 1.10 - 3.70 k/uL    Absolute Mono # 0.81 0.10 - 1.20 k/uL    Absolute Eos # 0.07 0.00 - 0.44 k/uL    Basophils Absolute 0.05 0.00 - 0.20 k/uL    Absolute Immature Granulocyte 0.06 0.00 - 0.30 k/uL    WBC Morphology NOT REPORTED     RBC Morphology ANISOCYTOSIS PRESENT     Platelet Estimate NOT REPORTED    Comprehensive Metabolic Panel w/ Reflex to MG    Collection Time: 04/19/21  2:43 PM   Result Value Ref Range    Glucose 107 (H) 70 - 99 mg/dL    BUN 13 8 - 23 mg/dL    CREATININE 0.67 (L) 0.70 - measuring 3 cm AP x 3.2 cm transverse by 2 cm craniocaudal.  Findings are suspicious for renal neoplasm     Ct Chest Pulmonary Embolism W Contrast    Result Date: 4/19/2021  Stable appearance of lungs compared to prior study. Persistent cystic collection left lung base postoperative changes left lung possible bronchopleural cannot be excluded small air-fluid level in cystic collection posteriorly left. Interval development of apical fibrosis in from prior study. Assessment :      Hospital Problems           Last Modified POA    * (Principal) GI bleed 4/19/2021 Yes    Primary malignant neoplasm of left lower lobe of lung ([address] 4/19/2021 Yes    Chronic GERD 4/19/2021 Yes    COPD without exacerbation ([address] 4/19/2021 Yes    Microcytic anemia 4/19/2021 Yes    Hyponatremia 4/19/2021 Yes    Kidney mass 4/19/2021 Yes          Plan:     Patient status inpatient in the Progressive Unit/Step down    1. Admit inpatient  2. Protonix 40 mg twice daily IV  3. GI cocktail for symptom management  4. Aerosol protocol  5. GI consultation, will likely benefit from EGD due to symptoms and history of peptic ulcer disease  6. Check iron levels  7. IV hydration  8. Trend electrolytes, correct as needed  9. Check urine and serum osmolality due to hyponatremia  10. Activity as tolerated, PT and OT as needed  11. Serial H&H, transfuse as needed  12. Clear liquid diet tonight, n.p.o. after midnight  13. Continue home medications with sips other than Plavix and Pletal  14. Outside records reviewed, thoracotomy with left lower lobectomy in 2018 for lung cancer. Also had thoracic radiation and chemotherapy in his treatment protocol  15. See orders for details  16.  We will need further imaging for kidney mass, possible as outpatient    Consultations:   IP CONSULT TO HOSPITALIST  IP CONSULT TO GI     Patient is admitted as inpatient status because of co-morbidities listed above, severity of signs and symptoms as outlined, requirement for current medical therapies and most importantly because of direct risk to patient if care not provided in a hospital setting. Expected length of stay > 48 hours.     Los Garcia DO  4/19/2021  6:51 PM    Copy sent to JUANITA Linn - CNP

## 2021-04-19 NOTE — ED PROVIDER NOTES
EMERGENCY DEPARTMENT ENCOUNTER    Pt Name: Reggie Roger  MRN: [id number]  [place] 1955  Date of evaluation: 4/19/21  CHIEF COMPLAINT       Chief Complaint   Patient presents with    Hematemesis     upcoming scope    Rectal Bleeding     10 pounds weight loss, unable to sleep lying down     HISTORY OF PRESENT ILLNESS   Patient is a 60-year-old male here with hematemesis hemoptysis and melena. He states he has had melena for about 3 days as well as hemoptysis for about 10 days. He states he has been losing weight unintentionally. Prior history of lung cancer he states he is in remission. History of COPD. He states to have a hard time laying flat at night due to heartburn. States he is having diffuse abdominal pain. He is on Plavix. Denies any lightheadedness dizziness shortness of breath. No fevers or chills or cough. He is due to have a colonoscopy in a few weeks. Denies history of PE DVT recent surgeries or leg swelling. Denies alcohol use or liver disease. REVIEW OF SYSTEMS     Review of Systems   Constitutional: Positive for fatigue. Negative for fever. HENT: Negative for trouble swallowing. Eyes: Negative for redness. Respiratory: Negative for shortness of breath. Cardiovascular: Positive for chest pain. Gastrointestinal: Positive for abdominal pain, blood in stool and vomiting. Genitourinary: Negative for difficulty urinating. Musculoskeletal: Negative for back pain and neck stiffness. Skin: Negative for rash. Neurological: Negative for seizures. Psychiatric/Behavioral: Negative for confusion.      PASTMEDICAL HISTORY     Past Medical History:   Diagnosis Date    Cancer ([facility]     COPD (chronic obstructive pulmonary disease) ([facility]     Vascular abnormality     vascular constriction     SURGICAL HISTORY       Past Surgical History:   Procedure Laterality Date    KNEE SURGERY      SINUS SURGERY       CURRENT MEDICATIONS       Previous Medications    ACETAMINOPHEN (TYLENOL) 500 MG TABLET    Take 500 mg by mouth    ALBUTEROL (ACCUNEB) 1.25 MG/3ML NEBULIZER SOLUTION    Inhale 1 ampule into the lungs 2 times daily    ASCORBIC ACID (VITAMIN C) 250 MG TABLET    Take 250 mg by mouth    B COMPLEX VITAMINS (VITAMIN B COMPLEX PO)    Take 1 capsule by mouth    CETIRIZINE (ZYRTEC) 10 MG CHEWABLE TABLET    Take 10 mg by mouth    CILOSTAZOL (PLETAL) 50 MG TABLET    Take 50 mg by mouth    CLOPIDOGREL (PLAVIX) 75 MG TABLET    TAKE 1 TABLET (75 MG TOTAL) BY MOUTH DAILY. FAMOTIDINE (PEPCID) 20 MG TABLET    Take 20 mg by mouth nightly as needed    FERROUS FUM-IRON POLYSACCH 162-115.2 MG CAPS    Take 1 capsule by mouth    FLUTICASONE (FLONASE) 50 MCG/ACT NASAL SPRAY    2 sprays by Nasal route    GLYCOPYRROLATE-FORMOTEROL (BEVESPI AEROSPHERE) 9-4.8 MCG/ACT AERO    Inhale 2 puffs into the lungs    GUAIFENESIN 400 MG TABLET    Take 400 mg by mouth 4 times daily as needed for Cough    GUAIFENESIN-CODEINE (TUSSI-ORGANIDIN NR) 100-10 MG/5ML SYRUP    Take 5 mLs by mouth. Maritza Decent HYDROXYCHLOROQUINE (PLAQUENIL) 200 MG TABLET    Take by mouth daily    MULTIPLE VITAMINS-MINERALS (MULTIVITAL PO)    Take 1 tablet by mouth    OMEGA-3 FATTY ACIDS (FISH OIL PO)    Take 2 g by mouth    PANTOPRAZOLE (PROTONIX) 40 MG TABLET    TAKE 1 TABLET BY MOUTH EVERY DAY    TRAMADOL (ULTRAM) 50 MG TABLET    TAKE 1 TABLET BY MOUTH EVERY 6 HOURS AS NEEDED FOR PAIN     ALLERGIES     is allergic to adhesive tape and sulfa antibiotics. FAMILY HISTORY     He indicated that the status of his mother is unknown. He indicated that the status of his father is unknown. He indicated that the status of his sister is unknown.      SOCIAL HISTORY       Social History     Tobacco Use    Smoking status: Former Smoker     Quit date: 3/12/2018     Years since quitting: 3.1    Smokeless tobacco: Never Used   Substance Use Topics    Alcohol use: No    Drug use: No     PHYSICAL EXAM     INITIAL VITALS: /65   Pulse 103   Temp 99 °F (37.2 PROCEDURES:    Procedures    DIAGNOSTIC RESULTS   EKG:All EKG's are interpreted by the Emergency Department Physician who either signs or Co-signs this chart in the absence of a cardiologist.    Normal sinus rhythm rate of 97 normal intervals normal axis no ST elevations or depressions no T wave changes Q wave in aVL and septal leads poor progression, abnormal EKG    RADIOLOGY:All plain film, CT, MRI, and formal ultrasound images (except ED bedside ultrasound) are read by the radiologist, see reports below, unless otherwisenoted in MDM or here. CT ABDOMEN PELVIS W IV CONTRAST Additional Contrast? None   Final Result   Distended stomach and proximal small bowel with decompressed distal small   bowel. There is the appearance of a mesenteric vascular swirl. The   possibility of a small bowel obstruction is suggested. The zone of   transition not adequately identified on this study. No free fluid. No   mesenteric edema noted. No bowel wall thickening noted no free air. Suspicious solid mass in the mid upper pole of the right kidney measuring 3   cm AP x 3.2 cm transverse by 2 cm craniocaudal.  Findings are suspicious for   renal neoplasm         CT CHEST PULMONARY EMBOLISM W CONTRAST   Final Result   Stable appearance of lungs compared to prior study. Persistent cystic   collection left lung base postoperative changes left lung possible   bronchopleural cannot be excluded small air-fluid level in cystic collection   posteriorly left. Interval development of apical fibrosis in from prior   study. LABS: All lab results were reviewed by myself, and all abnormals are listed below.   Labs Reviewed   CBC WITH AUTO DIFFERENTIAL - Abnormal; Notable for the following components:       Result Value    WBC 11.8 (*)     Hemoglobin 9.8 (*)     Hematocrit 32.8 (*)     MCV 75.4 (*)     MCH 22.5 (*)     RDW 15.4 (*)     Seg Neutrophils 83 (*)     Lymphocytes 8 (*)     Immature Granulocytes 1 (*)     Segs Absolute 9.87 (*)     Absolute Lymph # 0.94 (*)     All other components within normal limits   COMPREHENSIVE METABOLIC PANEL W/ REFLEX TO MG FOR LOW K - Abnormal; Notable for the following components:    Glucose 107 (*)     CREATININE 0.67 (*)     Sodium 128 (*)     Chloride 93 (*)     Total Bilirubin 0.21 (*)     All other components within normal limits   LIPASE   TROPONIN   BLOOD OCCULT STOOL DIAGNOSTIC   LACTIC ACID       EMERGENCY DEPARTMENTCOURSE:     Patient is a 49-year-old male here with what he states is spitting up blood and dark stools and weight loss. History of cancer he is on Plavix. He is tachycardic. He is having hard time laying flat he states to heartburn. Lungs clear heart sounds regular rhythm abdomen soft diffusely tender mildly no rebound or guarding or focal peritoneal findings. Hemoccults test at bedside. Will check labs, CT chest rule out PE given his history of cancer and discomfort in his chest and abdomen pelvis as well to evaluate for infection or bleeding. Patient's work-up shows hemoglobin 9.8, normal creatinine sodium is low at 128. CT show suspicious right kidney mass for neoplasm and also possible small bowel obstruction although clinically patient does not appear obstructed. Given weight loss, possible kidney mass, rectal bleeding and hematemesis in the setting of Plavix use will plan for admission. Spoke with Dr. Kassandra Gagnon will admit.     Vitals:    Vitals:    04/19/21 1312   BP: 116/65   Pulse: 103   Resp: 18   Temp: 99 °F (37.2 °C)   TempSrc: Oral   SpO2: 94%   Weight: 160 lb (72.6 kg)   Height: 5' 8\" (1.727 m)       The patient was given the following medications while in the emergency department:  Orders Placed This Encounter   Medications    pantoprazole (PROTONIX) injection 40 mg    0.9 % sodium chloride infusion    0.9 % sodium chloride bolus    sodium chloride flush 0.9 % injection 10 mL    iopamidol (ISOVUE-370) 76 % injection 75 mL    acetaminophen (TYLENOL) tablet 650 mg     CONSULTS:  IP CONSULT TO HOSPITALIST  IP CONSULT TO GI    FINAL IMPRESSION      1. Rectal bleeding    2. Kidney mass    3. Hyponatremia          DISPOSITION/PLAN   DISPOSITION  Decision to admit      PATIENT REFERRED TO:  No follow-up provider specified. DISCHARGE MEDICATIONS:  New Prescriptions    No medications on file     Gaby Villavicencio MD  Attending Emergency Physician    This note was created with the assistance of a speech-recognition program. While intending to generate a document that actually reflects the content of the visit, no guarantees can be provided that every mistake has been identified and corrected by editing.                     Gaby Villavicencio MD  04/19/21 9694

## 2021-04-20 ENCOUNTER — ANESTHESIA EVENT (OUTPATIENT)
Dept: OPERATING ROOM | Age: 66
DRG: 378 | End: 2021-04-20
Payer: MEDICARE

## 2021-04-20 ENCOUNTER — ANESTHESIA (OUTPATIENT)
Dept: OPERATING ROOM | Age: 66
DRG: 378 | End: 2021-04-20
Payer: MEDICARE

## 2021-04-20 VITALS
HEART RATE: 93 BPM | RESPIRATION RATE: 14 BRPM | WEIGHT: 147.2 LBS | SYSTOLIC BLOOD PRESSURE: 111 MMHG | BODY MASS INDEX: 22.31 KG/M2 | OXYGEN SATURATION: 96 % | TEMPERATURE: 98.8 F | HEIGHT: 68 IN | DIASTOLIC BLOOD PRESSURE: 57 MMHG

## 2021-04-20 VITALS — DIASTOLIC BLOOD PRESSURE: 55 MMHG | SYSTOLIC BLOOD PRESSURE: 103 MMHG | OXYGEN SATURATION: 94 %

## 2021-04-20 PROBLEM — I73.9 PAD (PERIPHERAL ARTERY DISEASE) (HCC): Status: ACTIVE | Noted: 2021-04-20

## 2021-04-20 LAB
ABSOLUTE EOS #: 0.08 K/UL (ref 0–0.44)
ABSOLUTE IMMATURE GRANULOCYTE: 0.05 K/UL (ref 0–0.3)
ABSOLUTE LYMPH #: 0.87 K/UL (ref 1.1–3.7)
ABSOLUTE MONO #: 0.87 K/UL (ref 0.1–1.2)
ANION GAP SERPL CALCULATED.3IONS-SCNC: 13 MMOL/L (ref 9–17)
BASOPHILS # BLD: 0 % (ref 0–2)
BASOPHILS ABSOLUTE: 0.04 K/UL (ref 0–0.2)
BUN BLDV-MCNC: 8 MG/DL (ref 8–23)
BUN/CREAT BLD: 13 (ref 9–20)
CALCIUM SERPL-MCNC: 8.7 MG/DL (ref 8.6–10.4)
CHLORIDE BLD-SCNC: 98 MMOL/L (ref 98–107)
CO2: 22 MMOL/L (ref 20–31)
CREAT SERPL-MCNC: 0.62 MG/DL (ref 0.7–1.2)
DIFFERENTIAL TYPE: ABNORMAL
EKG ATRIAL RATE: 97 BPM
EKG P AXIS: 51 DEGREES
EKG P-R INTERVAL: 138 MS
EKG Q-T INTERVAL: 380 MS
EKG QRS DURATION: 98 MS
EKG QTC CALCULATION (BAZETT): 482 MS
EKG R AXIS: -12 DEGREES
EKG T AXIS: 80 DEGREES
EKG VENTRICULAR RATE: 97 BPM
EOSINOPHILS RELATIVE PERCENT: 1 % (ref 1–4)
GFR AFRICAN AMERICAN: >60 ML/MIN
GFR NON-AFRICAN AMERICAN: >60 ML/MIN
GFR SERPL CREATININE-BSD FRML MDRD: ABNORMAL ML/MIN/{1.73_M2}
GFR SERPL CREATININE-BSD FRML MDRD: ABNORMAL ML/MIN/{1.73_M2}
GLUCOSE BLD-MCNC: 110 MG/DL (ref 70–99)
HCT VFR BLD CALC: 29.8 % (ref 40.7–50.3)
HCT VFR BLD CALC: 31.2 % (ref 40.7–50.3)
HCT VFR BLD CALC: 31.9 % (ref 40.7–50.3)
HEMOGLOBIN: 9.1 G/DL (ref 13–17)
HEMOGLOBIN: 9.5 G/DL (ref 13–17)
HEMOGLOBIN: 9.5 G/DL (ref 13–17)
IMMATURE GRANULOCYTES: 1 %
IRON SATURATION: 9 % (ref 20–55)
IRON: 17 UG/DL (ref 59–158)
LYMPHOCYTES # BLD: 9 % (ref 24–43)
MCH RBC QN AUTO: 23.2 PG (ref 25.2–33.5)
MCHC RBC AUTO-ENTMCNC: 30.5 G/DL (ref 28.4–34.8)
MCV RBC AUTO: 75.8 FL (ref 82.6–102.9)
MONOCYTES # BLD: 9 % (ref 3–12)
NRBC AUTOMATED: 0 PER 100 WBC
OSMOLALITY URINE: 373 MOSM/KG (ref 300–1170)
PDW BLD-RTO: 15.5 % (ref 11.8–14.4)
PLATELET # BLD: 322 K/UL (ref 138–453)
PLATELET ESTIMATE: ABNORMAL
PMV BLD AUTO: 8 FL (ref 8.1–13.5)
POTASSIUM SERPL-SCNC: 4.2 MMOL/L (ref 3.7–5.3)
RBC # BLD: 3.93 M/UL (ref 4.21–5.77)
RBC # BLD: ABNORMAL 10*6/UL
SARS-COV-2, RAPID: NOT DETECTED
SEG NEUTROPHILS: 80 % (ref 36–65)
SEGMENTED NEUTROPHILS ABSOLUTE COUNT: 7.72 K/UL (ref 1.5–8.1)
SODIUM BLD-SCNC: 133 MMOL/L (ref 135–144)
SPECIMEN DESCRIPTION: NORMAL
TOTAL IRON BINDING CAPACITY: 191 UG/DL (ref 250–450)
UNSATURATED IRON BINDING CAPACITY: 174 UG/DL (ref 112–347)
WBC # BLD: 9.6 K/UL (ref 3.5–11.3)
WBC # BLD: ABNORMAL 10*3/UL

## 2021-04-20 PROCEDURE — 6370000000 HC RX 637 (ALT 250 FOR IP): Performed by: FAMILY MEDICINE

## 2021-04-20 PROCEDURE — 6370000000 HC RX 637 (ALT 250 FOR IP): Performed by: INTERNAL MEDICINE

## 2021-04-20 PROCEDURE — 7100000000 HC PACU RECOVERY - FIRST 15 MIN: Performed by: INTERNAL MEDICINE

## 2021-04-20 PROCEDURE — 6360000002 HC RX W HCPCS: Performed by: NURSE ANESTHETIST, CERTIFIED REGISTERED

## 2021-04-20 PROCEDURE — 3609017100 HC EGD: Performed by: INTERNAL MEDICINE

## 2021-04-20 PROCEDURE — 85014 HEMATOCRIT: CPT

## 2021-04-20 PROCEDURE — 85018 HEMOGLOBIN: CPT

## 2021-04-20 PROCEDURE — 94664 DEMO&/EVAL PT USE INHALER: CPT

## 2021-04-20 PROCEDURE — 2580000003 HC RX 258: Performed by: INTERNAL MEDICINE

## 2021-04-20 PROCEDURE — C9113 INJ PANTOPRAZOLE SODIUM, VIA: HCPCS | Performed by: INTERNAL MEDICINE

## 2021-04-20 PROCEDURE — 2709999900 HC NON-CHARGEABLE SUPPLY: Performed by: INTERNAL MEDICINE

## 2021-04-20 PROCEDURE — 83540 ASSAY OF IRON: CPT

## 2021-04-20 PROCEDURE — 3700000001 HC ADD 15 MINUTES (ANESTHESIA): Performed by: INTERNAL MEDICINE

## 2021-04-20 PROCEDURE — 6360000002 HC RX W HCPCS: Performed by: INTERNAL MEDICINE

## 2021-04-20 PROCEDURE — 83550 IRON BINDING TEST: CPT

## 2021-04-20 PROCEDURE — 93010 ELECTROCARDIOGRAM REPORT: CPT | Performed by: INTERNAL MEDICINE

## 2021-04-20 PROCEDURE — 94640 AIRWAY INHALATION TREATMENT: CPT

## 2021-04-20 PROCEDURE — 2700000000 HC OXYGEN THERAPY PER DAY

## 2021-04-20 PROCEDURE — 2500000003 HC RX 250 WO HCPCS: Performed by: NURSE ANESTHETIST, CERTIFIED REGISTERED

## 2021-04-20 PROCEDURE — 99222 1ST HOSP IP/OBS MODERATE 55: CPT | Performed by: INTERNAL MEDICINE

## 2021-04-20 PROCEDURE — 80048 BASIC METABOLIC PNL TOTAL CA: CPT

## 2021-04-20 PROCEDURE — 3700000000 HC ANESTHESIA ATTENDED CARE: Performed by: INTERNAL MEDICINE

## 2021-04-20 PROCEDURE — 36415 COLL VENOUS BLD VENIPUNCTURE: CPT

## 2021-04-20 PROCEDURE — 0DJ08ZZ INSPECTION OF UPPER INTESTINAL TRACT, VIA NATURAL OR ARTIFICIAL OPENING ENDOSCOPIC: ICD-10-PCS | Performed by: INTERNAL MEDICINE

## 2021-04-20 PROCEDURE — 85025 COMPLETE CBC W/AUTO DIFF WBC: CPT

## 2021-04-20 PROCEDURE — 7100000001 HC PACU RECOVERY - ADDTL 15 MIN: Performed by: INTERNAL MEDICINE

## 2021-04-20 PROCEDURE — G0378 HOSPITAL OBSERVATION PER HR: HCPCS

## 2021-04-20 PROCEDURE — 2580000003 HC RX 258: Performed by: NURSE ANESTHETIST, CERTIFIED REGISTERED

## 2021-04-20 PROCEDURE — 99239 HOSP IP/OBS DSCHRG MGMT >30: CPT | Performed by: FAMILY MEDICINE

## 2021-04-20 PROCEDURE — 87635 SARS-COV-2 COVID-19 AMP PRB: CPT

## 2021-04-20 PROCEDURE — 43235 EGD DIAGNOSTIC BRUSH WASH: CPT | Performed by: INTERNAL MEDICINE

## 2021-04-20 RX ORDER — TRAMADOL HYDROCHLORIDE 50 MG/1
50 TABLET ORAL EVERY 8 HOURS PRN
COMMUNITY
Start: 2021-01-25

## 2021-04-20 RX ORDER — ACETAMINOPHEN 650 MG/1
650 SUPPOSITORY RECTAL EVERY 4 HOURS PRN
Status: DISCONTINUED | OUTPATIENT
Start: 2021-04-20 | End: 2021-04-20

## 2021-04-20 RX ORDER — ALBUTEROL SULFATE 2.5 MG/3ML
2.5 SOLUTION RESPIRATORY (INHALATION) 2 TIMES DAILY
COMMUNITY
Start: 2021-03-03

## 2021-04-20 RX ORDER — ALBUTEROL SULFATE 2.5 MG/3ML
2.5 SOLUTION RESPIRATORY (INHALATION) 2 TIMES DAILY
Status: DISCONTINUED | OUTPATIENT
Start: 2021-04-20 | End: 2021-04-20 | Stop reason: HOSPADM

## 2021-04-20 RX ORDER — PROPOFOL 10 MG/ML
INJECTION, EMULSION INTRAVENOUS PRN
Status: DISCONTINUED | OUTPATIENT
Start: 2021-04-20 | End: 2021-04-20

## 2021-04-20 RX ORDER — LIDOCAINE HYDROCHLORIDE 20 MG/ML
INJECTION, SOLUTION EPIDURAL; INFILTRATION; INTRACAUDAL; PERINEURAL PRN
Status: DISCONTINUED | OUTPATIENT
Start: 2021-04-20 | End: 2021-04-20 | Stop reason: SDUPTHER

## 2021-04-20 RX ORDER — TRAMADOL HYDROCHLORIDE 50 MG/1
50 TABLET ORAL EVERY 6 HOURS PRN
Status: DISCONTINUED | OUTPATIENT
Start: 2021-04-20 | End: 2021-04-20 | Stop reason: HOSPADM

## 2021-04-20 RX ORDER — LIDOCAINE HYDROCHLORIDE 20 MG/ML
INJECTION, SOLUTION EPIDURAL; INFILTRATION; INTRACAUDAL; PERINEURAL PRN
Status: DISCONTINUED | OUTPATIENT
Start: 2021-04-20 | End: 2021-04-20

## 2021-04-20 RX ORDER — FERROUS SULFATE 325(65) MG
325 TABLET ORAL
COMMUNITY

## 2021-04-20 RX ORDER — UMECLIDINIUM BROMIDE AND VILANTEROL TRIFENATATE 62.5; 25 UG/1; UG/1
1 POWDER RESPIRATORY (INHALATION) 2 TIMES DAILY
COMMUNITY
Start: 2021-04-14

## 2021-04-20 RX ORDER — PROPOFOL 10 MG/ML
INJECTION, EMULSION INTRAVENOUS PRN
Status: DISCONTINUED | OUTPATIENT
Start: 2021-04-20 | End: 2021-04-20 | Stop reason: SDUPTHER

## 2021-04-20 RX ORDER — SODIUM CHLORIDE 9 MG/ML
INJECTION, SOLUTION INTRAVENOUS CONTINUOUS PRN
Status: DISCONTINUED | OUTPATIENT
Start: 2021-04-20 | End: 2021-04-20 | Stop reason: SDUPTHER

## 2021-04-20 RX ORDER — FAMOTIDINE 40 MG/1
40 TABLET, FILM COATED ORAL NIGHTLY
COMMUNITY
Start: 2021-04-19

## 2021-04-20 RX ORDER — PANTOPRAZOLE SODIUM 40 MG/1
40 TABLET, DELAYED RELEASE ORAL
Qty: 30 TABLET | Refills: 3 | Status: SHIPPED | OUTPATIENT
Start: 2021-04-20

## 2021-04-20 RX ORDER — ONDANSETRON 2 MG/ML
4 INJECTION INTRAMUSCULAR; INTRAVENOUS
Status: DISCONTINUED | OUTPATIENT
Start: 2021-04-20 | End: 2021-04-20 | Stop reason: HOSPADM

## 2021-04-20 RX ADMIN — GLYCOPYRROLATE AND FORMOTEROL FUMARATE 2 PUFF: 9; 4.8 AEROSOL, METERED RESPIRATORY (INHALATION) at 10:18

## 2021-04-20 RX ADMIN — PROPOFOL 60 MG: 10 INJECTION, EMULSION INTRAVENOUS at 15:25

## 2021-04-20 RX ADMIN — ALBUTEROL SULFATE 2.5 MG: 2.5 SOLUTION RESPIRATORY (INHALATION) at 10:05

## 2021-04-20 RX ADMIN — ACETAMINOPHEN 650 MG: 325 TABLET ORAL at 11:52

## 2021-04-20 RX ADMIN — LIDOCAINE HYDROCHLORIDE 60 MG: 20 INJECTION, SOLUTION EPIDURAL; INFILTRATION; INTRACAUDAL; PERINEURAL at 15:25

## 2021-04-20 RX ADMIN — TRAMADOL HYDROCHLORIDE 50 MG: 50 TABLET, FILM COATED ORAL at 17:00

## 2021-04-20 RX ADMIN — PROPOFOL 50 MG: 10 INJECTION, EMULSION INTRAVENOUS at 15:30

## 2021-04-20 RX ADMIN — Medication 250 MG: at 09:11

## 2021-04-20 RX ADMIN — PANTOPRAZOLE SODIUM 40 MG: 40 INJECTION, POWDER, FOR SOLUTION INTRAVENOUS at 09:11

## 2021-04-20 RX ADMIN — CETIRIZINE HYDROCHLORIDE 10 MG: 10 TABLET, FILM COATED ORAL at 09:11

## 2021-04-20 RX ADMIN — SODIUM CHLORIDE: 9 INJECTION, SOLUTION INTRAVENOUS at 15:25

## 2021-04-20 RX ADMIN — TRAMADOL HYDROCHLORIDE 50 MG: 50 TABLET, FILM COATED ORAL at 08:02

## 2021-04-20 RX ADMIN — SODIUM CHLORIDE: 9 INJECTION, SOLUTION INTRAVENOUS at 08:02

## 2021-04-20 RX ADMIN — FLUTICASONE PROPIONATE 2 SPRAY: 50 SPRAY, METERED NASAL at 09:11

## 2021-04-20 ASSESSMENT — ENCOUNTER SYMPTOMS
DIARRHEA: 0
BACK PAIN: 0
COUGH: 1
WHEEZING: 0
SHORTNESS OF BREATH: 0
APNEA: 0
SINUS PRESSURE: 0
SHORTNESS OF BREATH: 1
TROUBLE SWALLOWING: 0
VOMITING: 0
SORE THROAT: 0
CONSTIPATION: 0
CHEST TIGHTNESS: 0
ABDOMINAL PAIN: 0
COUGH: 0
CHOKING: 0
RHINORRHEA: 0
VOICE CHANGE: 0
NAUSEA: 0
SHORTNESS OF BREATH: 0

## 2021-04-20 ASSESSMENT — PULMONARY FUNCTION TESTS
PIF_VALUE: 1

## 2021-04-20 ASSESSMENT — PAIN SCALES - GENERAL
PAINLEVEL_OUTOF10: 0
PAINLEVEL_OUTOF10: 0
PAINLEVEL_OUTOF10: 6
PAINLEVEL_OUTOF10: 2
PAINLEVEL_OUTOF10: 6
PAINLEVEL_OUTOF10: 7

## 2021-04-20 ASSESSMENT — PAIN DESCRIPTION - PAIN TYPE: TYPE: ACUTE PAIN

## 2021-04-20 NOTE — PROGRESS NOTES
[facility]  Office: [address], DO, Juan Alice, DO, Naida Oscar, DO, Nuris Massey Blood, DO, Julia Comer MD, Shahriar Card MD, Rachele Haro MD, Deedee Brunner, MD, Lela Encarnacion MD, Sally Smith MD, Madison Castillo MD, Rola Brasher MD, Theodora Solano, DO, Marissa Castillo MD, Elissa Martínez, DO, Faye Garcia MD,  Mark Shaffer DO, Wilder Gutiérrez MD, Parris Mendoza MD, Arlyn Medeiros MD, Ji Reed MD, Tawny Liriano, [facility], [facility], [facility], [facility], [facility], Karine Callahan, CNS, Arron Pan, CNP, Nola Phalen, CNP, Fabricio Toussaint, CNP, Rm Mins, CNP, Abhishek Pham, CNP, Timothy Durán PA-C, Abdiel Petersen DNP, Remington Nino, CNP, Tosrten Rome, CNP, Gayatri Underwood, CNP, Ashley Forrest, CNP, Antonino Camargo, [facility], [facility]      Daily Progress Note     Admit Date: 4/19/2021  Bed/Room No.  1012/1012-02  Admitting Physician : Rachele Haro MD  Code Status :Full Code  Hospital Day:  LOS: 1 day   Chief Complaint:     Chief Complaint   Patient presents with    Hematemesis     upcoming scope    Rectal Bleeding     10 pounds weight loss, unable to sleep lying down     Principal Problem:    GI bleed  Active Problems:    Primary malignant neoplasm of left lower lobe of lung ([facility] [address]    Chronic GERD    COPD without exacerbation ([facility] [address]    Microcytic anemia    Hyponatremia    Kidney mass  Resolved Problems:    * No resolved hospital problems. *    Subjective : Interval History/Significant events :  04/20/21    Patient underwent EGD, no source of bleeding was identified. He does not report any further bleeding. Patient denies abdominal pain, nausea, vomiting. He is tolerating diet. Vitals - Stable afebrile  Labs -hemoglobin stable. Nursing notes , Consults notes reviewed. Overnight events and updates discussed with Nursing staff .    Background History:         Debora Glover is 77 y.o. male  Who was admitted to for activity change, appetite change, fatigue, fever and unexpected weight change. HENT: Negative for congestion, nosebleeds, rhinorrhea, sinus pressure, sneezing and voice change. Respiratory: Negative for cough, choking, chest tightness, shortness of breath and wheezing. Cardiovascular: Negative for chest pain, palpitations and leg swelling. Gastrointestinal: Negative for abdominal pain, constipation, diarrhea, nausea and vomiting. Genitourinary: Negative for difficulty urinating, discharge, dysuria, frequency and testicular pain. Musculoskeletal: Negative for back pain. Skin: Negative for rash. Neurological: Negative for dizziness, weakness, light-headedness and headaches. Hematological: Does not bruise/bleed easily. Psychiatric/Behavioral: Negative for agitation, behavioral problems, confusion, self-injury, sleep disturbance and suicidal ideas. Objective :      Current Vitals : Temp: 98.2 °F (36.8 °C),  Pulse: 91, Resp: 18, BP: 112/62, SpO2: 99 %  Last 24 Hrs Vitals   Patient Vitals for the past 24 hrs:   BP Temp Temp src Pulse Resp SpO2 Height Weight   04/20/21 0829 112/62 98.2 °F (36.8 °C) Oral 91 18 99 % -- --   04/20/21 0314 116/66 98.4 °F (36.9 °C) Oral 94 16 97 % -- --   04/20/21 0109 118/62 98.2 °F (36.8 °C) Oral 92 16 98 % -- --   04/19/21 2152 119/63 98.2 °F (36.8 °C) Oral 94 18 95 % -- --   04/19/21 1904 112/73 -- -- -- -- -- -- --   04/19/21 1750 136/71 -- -- 112 (!) 32 93 % -- 147 lb 3.2 oz (66.8 kg)   04/19/21 1641 -- -- -- 98 24 -- -- --   04/19/21 1550 116/68 -- -- 96 27 96 % -- --   04/19/21 1510 119/74 -- -- 99 26 -- -- --   04/19/21 1437 118/69 -- -- 96 -- -- -- --   04/19/21 1312 116/65 99 °F (37.2 °C) Oral 103 18 94 % 5' 8\" (1.727 m) 160 lb (72.6 kg)     Intake / output   04/19 0701 - 04/20 0700  In: -   Out: 550 [Urine:550]  Physical Exam:  Physical Exam  Vitals signs and nursing note reviewed. Constitutional:       General: He is not in acute distress. Appearance: He is not diaphoretic. HENT:      Head: Normocephalic and atraumatic. Nose:      Right Sinus: No maxillary sinus tenderness or frontal sinus tenderness. Left Sinus: No maxillary sinus tenderness or frontal sinus tenderness. Mouth/Throat:      Pharynx: No oropharyngeal exudate. Eyes:      General: No scleral icterus. Conjunctiva/sclera: Conjunctivae normal.      Pupils: Pupils are equal, round, and reactive to light. Neck:      Musculoskeletal: Full passive range of motion without pain and neck supple. Thyroid: No thyromegaly. Vascular: No JVD. Cardiovascular:      Rate and Rhythm: Normal rate and regular rhythm. Pulses:           Dorsalis pedis pulses are 2+ on the right side and 2+ on the left side. Heart sounds: Normal heart sounds. No murmur. Pulmonary:      Effort: Pulmonary effort is normal.      Breath sounds: Normal breath sounds. No wheezing or rales. Comments: Left sided scar from lobectomy  Abdominal:      Palpations: Abdomen is soft. There is no mass. Tenderness: There is no abdominal tenderness. Lymphadenopathy:      Head:      Right side of head: No submandibular adenopathy. Left side of head: No submandibular adenopathy. Cervical: No cervical adenopathy. Skin:     General: Skin is warm. Neurological:      Mental Status: He is alert and oriented to person, place, and time. Motor: No tremor. Psychiatric:         Behavior: Behavior is cooperative.            Laboratory findings:    Recent Labs     04/19/21  1443 04/20/21  0033 04/20/21  0558   WBC 11.8*  --  9.6   HGB 9.8* 9.5* 9.1*   HCT 32.8* 31.2* 29.8*     --  322     Recent Labs     04/19/21  1443 04/20/21  0558   * 133*   K 4.2 4.2   CL 93* 98   CO2 20 22   GLUCOSE 107* 110*   BUN 13 8   CREATININE 0.67* 0.62*   CALCIUM 9.3 8.7     Recent Labs     04/19/21  1443   PROT 7.0   LABALBU 3.8   AST 13   ALT 12   ALKPHOS 82   BILITOT 0.21*   LIPASE 19 No results found for: Jose Jaime, RBCUA, BLOODU, BACTERIA, NITRU, WBCUA, LEUKOCYTESUR    Imaging / Clinical Data :-   Ct Abdomen Pelvis W Iv Contrast Additional Contrast? None    Result Date: 4/19/2021  Distended stomach and proximal small bowel with decompressed distal small bowel. There is the appearance of a mesenteric vascular swirl. The possibility of a small bowel obstruction is suggested. The zone of transition not adequately identified on this study. No free fluid. No mesenteric edema noted. No bowel wall thickening noted no free air. Suspicious solid mass in the mid upper pole of the right kidney measuring 3 cm AP x 3.2 cm transverse by 2 cm craniocaudal.  Findings are suspicious for renal neoplasm     Ct Chest Pulmonary Embolism W Contrast    Result Date: 4/19/2021  Stable appearance of lungs compared to prior study. Persistent cystic collection left lung base postoperative changes left lung possible bronchopleural cannot be excluded small air-fluid level in cystic collection posteriorly left. Interval development of apical fibrosis in from prior study. Clinical Course : stable  Assessment and Plan  :        1. Upper GI bleed-on Protonix, treated with Protonix. Underwent EGD and was found Smith's esophagus without any acute source of bleeding identified. Continue Protonix oral twice daily with Pepcid. Follow-up with primary gastroenterologist Dr. Levi Jimenez for scheduled colonoscopy on 5/5/2021. .  2. COPD-stable  3. Anemia secondary to chronic blood loss-continue iron supplement  4. Hyponatremia improved with IV fluid. 5. Right kidney mass -suspicious for renal neoplasm -outpatient urology follow-up. 6. H/o non-small cell left lower lobe lung cancer s/p lobectomy, adjuvant chemo and radiation therapy in 2018      Discharge home  Plan and updates discussed with patient ,  answers  explained to satisfaction.    Plan discussed with Staff Connie BANERJEE     (Please note that portions

## 2021-04-20 NOTE — PROGRESS NOTES
Pt discharged to home by car with spouse, pt read and understood discharge instructions, pt will follow up with pcp and Dr. Latoya Forrest for colonoscopy, protonix escribed to [facility], no home care needed, pt discharged in stable condition

## 2021-04-20 NOTE — ANESTHESIA PRE PROCEDURE
Department of Anesthesiology  Preprocedure Note       Name:  Guille Contreras   Age:  77 y.o.  :  1955                                          MRN:  [id number]         Date:  2021      Surgeon: Gaye Miller):  Veda Francis MD    Procedure: Procedure(s):  EGD DIAGNOSTIC ONLY    Medications prior to admission:   Prior to Admission medications    Medication Sig Start Date End Date Taking? Authorizing Provider   hydroxychloroquine (PLAQUENIL) 200 MG tablet Take by mouth daily   Yes Historical Provider, MD   famotidine (PEPCID) 20 MG tablet Take 20 mg by mouth nightly as needed   Yes Historical Provider, MD potteraiFENesin 400 MG tablet Take 400 mg by mouth 4 times daily as needed for Cough   Yes Historical Provider, MD   albuterol (ACCUNEB) 1.25 MG/3ML nebulizer solution Inhale 1 ampule into the lungs 2 times daily   Yes Historical Provider, MD   acetaminophen (TYLENOL) 500 MG tablet Take 500 mg by mouth   Yes Historical Provider, MD   ascorbic acid (VITAMIN C) 250 MG tablet Take 250 mg by mouth   Yes Historical Provider, MD   B Complex Vitamins (VITAMIN B COMPLEX PO) Take 1 capsule by mouth   Yes Historical Provider, MD   cetirizine (ZYRTEC) 10 MG chewable tablet Take 10 mg by mouth   Yes Historical Provider, MD   clopidogrel (PLAVIX) 75 MG tablet TAKE 1 TABLET (75 MG TOTAL) BY MOUTH DAILY.  18  Yes Historical Provider, MD   Ferrous Fum-Iron Polysacch 162-115.2 MG CAPS Take 1 capsule by mouth   Yes Historical Provider, MD   fluticasone (FLONASE) 50 MCG/ACT nasal spray 2 sprays by Nasal route 17  Yes Historical Provider, MD   Multiple Vitamins-Minerals (MULTIVITAL PO) Take 1 tablet by mouth   Yes Historical Provider, MD   pantoprazole (PROTONIX) 40 MG tablet TAKE 1 TABLET BY MOUTH EVERY DAY 19  Yes Historical Provider, MD   traMADol (ULTRAM) 50 MG tablet TAKE 1 TABLET BY MOUTH EVERY 6 HOURS AS NEEDED FOR PAIN 10/30/18  Yes Historical Provider, MD   guaiFENesin-codeine (Paty Baptise) 100-10 MG/5ML syrup Take 5 mLs by mouth. . 1/2/19   Historical Provider, MD   glycopyrrolate-formoterol (Clifton Gander) 9-4.8 MCG/ACT AERO Inhale 2 puffs into the lungs 5/3/18   Historical Provider, MD   Omega-3 Fatty Acids (FISH OIL PO) Take 2 g by mouth    Historical Provider, MD   cilostazol (PLETAL) 50 MG tablet Take 50 mg by mouth    Historical Provider, MD       Current medications:    Current Facility-Administered Medications   Medication Dose Route Frequency Provider Last Rate Last Admin    albuterol (PROVENTIL) nebulizer solution 2.5 mg  2.5 mg Nebulization BID Brea Amabile Orlop, DO   2.5 mg at 04/20/21 1005    fluticasone (FLONASE) 50 MCG/ACT nasal spray 2 spray  2 spray Nasal Daily Brea Amabile Orlop, DO   2 spray at 04/20/21 0911    0.9 % sodium chloride infusion   Intravenous Continuous Brea Amabile Orlop,  mL/hr at 04/20/21 0802 New Bag at 04/20/21 0802    sodium chloride flush 0.9 % injection 5-40 mL  5-40 mL Intravenous 2 times per day Brea Amabile Orlop, DO   10 mL at 04/19/21 2306    sodium chloride flush 0.9 % injection 5-40 mL  5-40 mL Intravenous PRN Brea Amabile Orlop, DO        0.9 % sodium chloride infusion  25 mL Intravenous PRN Brea Amabile Orlop, DO        promethazine (PHENERGAN) tablet 12.5 mg  12.5 mg Oral Q6H PRN Brea Amabile Orlop, DO        Or    ondansetron (ZOFRAN) injection 4 mg  4 mg Intravenous Q6H PRN Brea Amabile Orlop, DO        acetaminophen (TYLENOL) tablet 650 mg  650 mg Oral Q6H PRN Brea Amabile Orlop, DO   650 mg at 04/20/21 1152    Or    acetaminophen (TYLENOL) suppository 650 mg  650 mg Rectal Q6H PRN Brea Amabile Orlop, DO        albuterol (PROVENTIL) nebulizer solution 2.5 mg  2.5 mg Nebulization As Directed RT PRN Brea Amabile Orlop, DO        pantoprazole (PROTONIX) injection 40 mg  40 mg Intravenous Q12H Aldo Meza, DO   40 mg at 04/20/21 0917    And    sodium chloride (PF) 0.9 % injection 10 mL  10 mL Intravenous Q12H Aldo Copelandp, DO   10 mL at 04/19/21 8739    vitamin C tablet 250 mg  250 mg Oral Daily Vinetta Becket Orlop, DO   250 mg at 04/20/21 0911    cetirizine (ZYRTEC) tablet 10 mg  10 mg Oral Daily Vinetta Becket Orlop, DO   10 mg at 04/20/21 0911    glycopyrrolate-formoterol (BEVESPI) 9-4.8 MCG/ACT inhaler 2 puff  2 puff Inhalation Daily Vinetta Becket Orlop, DO   2 puff at 04/20/21 1018    guaiFENesin-codeine (GUAIFENESIN AC) 100-10 MG/5ML liquid 5 mL  5 mL Oral Q4H PRN Vinetta Becket Orlop, DO   5 mL at 04/19/21 2255    hydroxychloroquine (PLAQUENIL) tablet 200 mg  200 mg Oral Daily Vinetta Becket Orlop, DO        traMADol (ULTRAM) tablet 50 mg  50 mg Oral Q6H PRN Vinetta Becket Orlop, DO   50 mg at 04/20/21 0802    aluminum & magnesium hydroxide-simethicone (MAALOX) 30 mL, lidocaine viscous hcl (XYLOCAINE) 5 mL (GI COCKTAIL)   Oral Q6H PRN Vinetta Becket Orlop, DO           Allergies:     Allergies   Allergen Reactions    Adhesive Tape Other (See Comments)     redness    Sulfa Antibiotics      unsure       Problem List:    Patient Active Problem List   Diagnosis Code    Primary malignant neoplasm of left lower lobe of lung (HCC) C34.32    GI bleed K92.2    Chronic GERD K21.9    COPD without exacerbation ([facility] [address] J44.9    Microcytic anemia D50.9    Hyponatremia E87.1    Kidney mass N28.89       Past Medical History:        Diagnosis Date    BPH (benign prostatic hyperplasia)     Cancer ([facility] [address]     COPD (chronic obstructive pulmonary disease) (HCC)     GERD (gastroesophageal reflux disease)     Peptic ulcer disease     Peripheral arterial disease (HCC)     Vascular abnormality     vascular constriction       Past Surgical History:        Procedure Laterality Date    BRONCHOSCOPY      COLONOSCOPY      KNEE SURGERY      LUNG REMOVAL, PARTIAL Left     Left lower lobe    SINUS SURGERY      TONSILLECTOMY         Social History:    Social History     Tobacco Use    Smoking status: Former Smoker     Quit date: 3/12/2018     Years since quitting: 3.1    Smokeless tobacco: Never results found for: COVID19        Anesthesia Evaluation    Airway: Mallampati: I  TM distance: >3 FB   Neck ROM: full  Mouth opening: > = 3 FB Dental:          Pulmonary:   (+) COPD:      (-) shortness of breath                           Cardiovascular:        (-)  angina and murmur                Neuro/Psych:               GI/Hepatic/Renal:             Endo/Other:                     Abdominal:           Vascular:   + PVD, aortic or cerebral, . Anesthesia Plan      general     ASA 3             Anesthetic plan and risks discussed with patient.                       Cecilia Granado MD   4/20/2021

## 2021-04-20 NOTE — PROGRESS NOTES
Transitions of Care Pharmacy Service   Medication Review    The patient's list of current home medications has been reviewed. Source(s) of information: Patient/ Surescripts    Based on information provided by the above source(s), I have updated the patient's home med list as described below. Please review the ACTION REQUESTED section of this note below for any discrepancies on current hospital orders. I changed or updated the following medications on the patient's home medication list:  Removed Vitamin C 250mg PO daily  Pletal 50mg  Omega-3 fish oil  Bevespi - see Anoro  Tussi-organidin     Added Anoro Elipta 1 puff BID     Adjusted   Plaquenil 200mg PO daily to BID  Pepcid 20mg nightly to 40mg nightly  Protonix 40mg daily to 40mg BID AC  Vit B to vit B with C 1 cap daily  Ferrous fumerate to FeSO4 325mg PO daily  Albuterol 1.25mg to 2.5mg via nebulizer BID   Other Notes none         PROVIDER ACTION REQUESTED  Medications that need to be addressed by a physician/nurse practitioner:    Medication Action Requested   Plaquenil     Please adjust dose to BID per home dosing. Please feel free to call me with any questions about this encounter. Thank you. Sachin Gibbs [address] [place] [address]   Transitions of Care Pharmacy Service  Phone:  [phone]  Fax: [phone]      Electronically signed by Sachin Gibbs [address] [place] [address] on 4/20/2021 at 5:27 PM           Medications Prior to Admission: ANORO ELLIPTA 62.5-25 MCG/INH AEPB inhaler, Inhale 1 puff into the lungs 2 times daily  albuterol (PROVENTIL) (2.5 MG/3ML) 0.083% nebulizer solution, Take 2.5 mg by nebulization 2 times daily   traMADol (ULTRAM) 50 MG tablet, Take 50 mg by mouth every 8 hours as needed.   VITAMIN B COMPLEX-C PO, Take 1 capsule by mouth daily  ferrous sulfate (IRON 325) 325 (65 Fe) MG tablet, Take 325 mg by mouth daily (with breakfast)  hydroxychloroquine (PLAQUENIL) 200 MG tablet, Take 200 mg by mouth 2 times daily   guaiFENesin 400 MG tablet, Take 400 mg

## 2021-04-20 NOTE — CARE COORDINATION
Case Management Initial Discharge Plan  Laveta Proper,         Readmission Risk              Risk of Unplanned Readmission:        17             Met with:patient to discuss discharge plans. Information verified: address, contacts, phone number, , insurance Yes  PCP: JUANITA Fonseca CNP  Date of last visit: 3/17    Insurance Provider: Maribel Reilly medicare     Discharge Planning  Current Residence:  Private home   Living Arrangements:  Spouse/Significant Other       Home has 2 stories/1  stairs to climb to enter the home. Bed and bath are both upstairs   Support Systems:  Spouse/Significant Other, Family Members       Current Services PTA:  Home 02    Agency:  2 liters at  [facility]ru [facility]       Patient able to perform ADL's:Independent  DME in home:  02 at  only thru [facility] , neb   DME used to aid ambulation prior to admission:   None   DME used during admission:  None     Potential Assistance Needed:  N/A    Pharmacy: [facility]    Potential Assistance Purchasing Medications:  No  Does patient want to participate in local refill/ meds to beds program?  Not Assessed    Patient agreeable to home care: does not think so   Freedom of choice provided:  n/a      Type of Home Care Services:  None  Patient expects to be discharged to:  Home    Prior SNF/Rehab Placement and Facility: none   Agreeable to SNF/Rehab: No  [place] of choice provided: n/a   Evaluation: n/a    Expected Discharge date:      Follow Up Appointment: Best Day/ Time:  any     Transportation provider: per wife   Transportation arrangements needed for discharge: No    Discharge Plan:   Met with patient to discuss a discharge assessment. Patient lives at home with wife. He is independent and still drives but wife also helps him as needed. He had a LL lobectomy in 2018 for lung cancer. Underwent chemo radiation at the [facility]. He now follows every 6 months with DR Pedro Phillips.      Patient is wearing 3 paulinas NC at this time but he only has concentrator at home wearing it at night. Will have to monitor if needed at time of discharge. Patient admitted with GI bleed and GI is consulted. Will follow but at this time he is declining any skilled needs at time of discharge.      Electronically signed by Luiza Chong RN on 4/20/21 at 2:20 PM EDT

## 2021-04-20 NOTE — OP NOTE
ESOPHAGOGASTRODUODENOSCOPY   ( EGD )  DATE OF PROCEDURE: 4/20/2021     SURGEON: Nando Bui MD    ASSISTANT: None    PREOPERATIVE DIAGNOSIS: Chronic glaucoma, history of hematemesis    POSTOPERATIVE DIAGNOSIS: No lesions seen up to the fourth part of the duodenum. OPERATION: Upper GI endoscopy with Biopsy    ANESTHESIA: MAC    ESTIMATED BLOOD LOSS: None    COMPLICATIONS: None. SPECIMENS:  Was Not Obtained    HISTORY: The patient is a 77y.o. year old male with history of above preop diagnosis. I recommended esophagogastroduodenoscopy with possible biopsy and I explained the risk, benefits, expected outcome, and alternatives to the procedure. Risks included but are not limited to bleeding, infection, respiratory distress, hypotension, and perforation of the esophagus, stomach, or duodenum. Patient understands and is in agreement. PROCEDURE: The patient was given IV conscious sedation. The patient's SPO2 remained above 90% throughout the procedure. Cetacaine spray given. Patient placed in left lateral position. Olympus  videogastroscope was inserted orally under vision into the esophagus without difficulty and advanced into the stomach then through the pylorus up to the second part of duodenum. Findings:    Retropharyngeal area was grossly normal appearing    Esophagus: normal.  No signs of peptic esophagitis Smith's mucosa seen. Squamocolumnar junction at about 41 cm and lower esophageal sphincter opens normally. Stomach:    Fundus and Cardia Examined in Retroflexed View: normal    Body: normal    Antrum: normal    Duodenum:     Descending: normal.  Duodenum examined up to fourth part. Bulb: normal  No fluid seen in the duodenum suggestive of duodenal obstruction or visual obstruction. While withdrawing the scope the above findings were verified and the scope was removed. The patient has tolerated the procedure without unusual events.          Recommendations/Plan: 1.   2. F/U In Office as instructed  3.  Discussed with the family                   Electronically signed by Lisa Calvillo MD  on 4/20/2021 at 3:35 PM

## 2021-04-20 NOTE — PROGRESS NOTES
Pt returned from EGD, vss, pt complains of chronic neck and back pain, pt wife wondering if pt will be discharged today, Dr. Lucero Tiwari

## 2021-04-20 NOTE — PROGRESS NOTES
Leon Mchugh NP phoned and updated on pt condition, keep pt NPO for now and they will be in to see pt this afternoon

## 2021-04-20 NOTE — PROGRESS NOTES
Luis A Alaniz, PPatient Assessment complete. GI bleed [K92.2] . Vitals:    04/20/21 0314   BP: 116/66   Pulse: 94   Resp: 16   Temp: 98.4 °F (36.9 °C)   SpO2: 97%   . Patients home meds are   Prior to Admission medications    Medication Sig Start Date End Date Taking? Authorizing Provider   hydroxychloroquine (PLAQUENIL) 200 MG tablet Take by mouth daily   Yes Historical Provider, MD   famotidine (PEPCID) 20 MG tablet Take 20 mg by mouth nightly as needed   Yes Historical Provider, MD   guaiFENesin 400 MG tablet Take 400 mg by mouth 4 times daily as needed for Cough   Yes Historical Provider, MD   albuterol (ACCUNEB) 1.25 MG/3ML nebulizer solution Inhale 1 ampule into the lungs 2 times daily   Yes Historical Provider, MD   acetaminophen (TYLENOL) 500 MG tablet Take 500 mg by mouth   Yes Historical Provider, MD   ascorbic acid (VITAMIN C) 250 MG tablet Take 250 mg by mouth   Yes Historical Provider, MD   B Complex Vitamins (VITAMIN B COMPLEX PO) Take 1 capsule by mouth   Yes Historical Provider, MD   cetirizine (ZYRTEC) 10 MG chewable tablet Take 10 mg by mouth   Yes Historical Provider, MD   clopidogrel (PLAVIX) 75 MG tablet TAKE 1 TABLET (75 MG TOTAL) BY MOUTH DAILY. 12/11/18  Yes Historical Provider, MD   Ferrous Fum-Iron Polysacch 162-115.2 MG CAPS Take 1 capsule by mouth   Yes Historical Provider, MD   fluticasone (FLONASE) 50 MCG/ACT nasal spray 2 sprays by Nasal route 9/6/17  Yes Historical Provider, MD   Multiple Vitamins-Minerals (MULTIVITAL PO) Take 1 tablet by mouth   Yes Historical Provider, MD   pantoprazole (PROTONIX) 40 MG tablet TAKE 1 TABLET BY MOUTH EVERY DAY 1/8/19  Yes Historical Provider, MD   traMADol (ULTRAM) 50 MG tablet TAKE 1 TABLET BY MOUTH EVERY 6 HOURS AS NEEDED FOR PAIN 10/30/18  Yes Historical Provider, MD potteraiFENesin-codeine (TUSSI-ORGANIDIN NR) 100-10 MG/5ML syrup Take 5 mLs by mouth. . 1/2/19   Historical Provider, MD   glycopyrrolate-formoterol (Severiano Eaton) 9-4.8 MCG/ACT AERO Inhale 2 puffs into the lungs 5/3/18   Historical Provider, MD   Omega-3 Fatty Acids (FISH OIL PO) Take 2 g by mouth    Historical Provider, MD   cilostazol (PLETAL) 50 MG tablet Take 50 mg by mouth    Historical Provider, MD   .  Recent Surgical History: None = 0     Assessment     Peak Flow (asthma only)    Predicted: n/a  Personal Best: n/a  PEF n/a  % Predicted n/a  Peak Flow : not applicable = 0    GWZ1/VOY    FEV1 Predicted n/a      FEV1 n/a    FEV1 % Predicted n/a  FVC n/a  IS volume n/a  IBW n/a  FIO2% 32  SPO2 97%  RR 16  Breath Sounds: clear/diminished in the bases      · Bronchodilator assessment at level  1 (BID per home meds)  · Hyperinflation assessment at level   · Secretion Management assessment at level   ·   · Patient takes albuterol MDI twice daily at home.     ·   · []    Bronchodilator Assessment  BRONCHODILATOR ASSESSMENT SCORE  Score 0 1 2 3 4 5   Breath Sounds   []  Patient Baseline [x]  No Wheeze good aeration []  Faint, scattered wheezing, good aeration []  Expiratory Wheezing and or moderately diminished []  Insp/Exp wheeze and/or very diminished []  Insp/Exp and/ or marked distress   Respiratory Rate   []  Patient Baseline [x]  Less than 20 []  Less than 20 []  20-25 []  Greater than 25 []  Greater than 25   Peak flow % of Pred or PB [x]  NA   []  Greater than 90%  []  81-90% []  71-80% []  Less than or equal to 70%  or unable to perform []  Unable due to Respiratory Distress   Dyspnea re []  Patient Baseline [x]  No SOB []  No SOB []  SOB on exertion []  SOB min activity []  At rest/acute   e FEV% Predicted       [x]  NA []  Above 69%  []  Unable []  Above 60-69%  []  Unable []  Above 50-59%  []  Unable []  Above 35-49%  []  Unable []  Less than 35%  []  Unable                 []  Hyperinflation Assessment  Score 1 2 3   CXR and Breath Sounds   []  Clear []  No atelectasis  Basilar aeration []  Atelectasis or absent basilar breath sounds   Incentive Spirometry Volume  (Per IBW) []  Greater than or equal to 15ml/Kg []  less than 15ml/Kg []  less than 15ml/Kg   Surgery within last 2 weeks []  None or general   []  Abdominal or thoracic surgery  []  Abdominal or thoracic   Chronic Pulmonary Historyre []  No []  Yes []  Yes     []  Secretion Management Assessment  Score 1 2 3   Bilateral Breath Sounds   []  Occasional Rhonchi []  Scattered Rhonchi []  Course Rhonchi and/or poor aeration   Sputum    []  Small amount of thin secretions []  Moderate amount of viscous secretions []  Copius, Viscious Yellow/ Secretions   CXR as reported by physician []  clear  []  Unavailable []  Infiltrates and/or consolidation  []  Unavailable []  Mucus Plugging and or lobar consolidation  []  Unavailable   Cough []  Strong, productive cough []  Weak productive cough []  No cough or weak non-productive cough

## 2021-04-20 NOTE — CARE COORDINATION
Advance Care Planning     Advance Care Planning Activator (Inpatient)  Conversation Note      Date of ACP Conversation: 4/19/2021    Conversation Conducted with: Patient with Decision Making Capacity    ACP Activator: [id number] [facility] Decision Maker:     Current Designated Health Care Decision Maker:     Click here to complete Healthcare Decision Makers including section of the Healthcare Decision Maker Relationship (ie \"Primary\")  Today we documented Decision Maker(s) consistent with Legal Next of Kin hierarchy. Care Preferences    Ventilation: \"If you were in your present state of health and suddenly became very ill and were unable to breathe on your own, what would your preference be about the use of a ventilator (breathing machine) if it were available to you? \"      Would the patient desire the use of ventilator (breathing machine)?: yes    \"If your health worsens and it becomes clear that your chance of recovery is unlikely, what would your preference be about the use of a ventilator (breathing machine) if it were available to you? \"     Would the patient desire the use of ventilator (breathing machine)?: Yes      Resuscitation  \"CPR works best to restart the heart when there is a sudden event, like a heart attack, in someone who is otherwise healthy. Unfortunately, CPR does not typically restart the heart for people who have serious health conditions or who are very sick. \"    \"In the event your heart stopped as a result of an underlying serious health condition, would you want attempts to be made to restart your heart (answer \"yes\" for attempt to resuscitate) or would you prefer a natural death (answer \"no\" for do not attempt to resuscitate)? \" yes       [x] Yes   [] No   Educated Patient / Ross Mohs regarding differences between Advance Directives and portable DNR orders.     Length of ACP Conversation in minutes:      Conversation Outcomes:  [x] ACP discussion completed  [] Existing advance directive reviewed with patient; no changes to patient's previously recorded wishes  [] New Advance Directive completed  [] Portable Do Not Rescitate prepared for Provider review and signature  [] POLST/POST/MOLST/MOST prepared for Provider review and signature      Follow-up plan:    [] Schedule follow-up conversation to continue planning  [] Referred individual to Provider for additional questions/concerns   [] Advised patient/agent/surrogate to review completed ACP document and update if needed with changes in condition, patient preferences or care setting    [x] This note routed to one or more involved healthcare providers    {

## 2021-04-20 NOTE — PROGRESS NOTES
.Patient admitted to room 1012. VS taken, telemetry placed and call light given/within reach. Patient A&O and given room orientation. Orders released/reviewed, initial assessment completed and navigator started. See chart for more detail.

## 2021-04-20 NOTE — PLAN OF CARE
Problem: Pain:  Goal: Pain level will decrease  Description: Pain level will decrease  Outcome: Ongoing  Note: Pain assessment completed. Patient demonstrates understanding of pain rating scale and interventions. At this time patient states pain is consistent    Will continue to monitor and reassess with each rounding and PRN.        Problem: DAILY CARE  Goal: Daily care needs are met  Outcome: Ongoing     Problem: SKIN INTEGRITY  Goal: Skin integrity is maintained or improved  Outcome: Ongoing

## 2021-04-20 NOTE — CONSULTS
GI Consult Note:    Name: Caryle Asters  MRN: [id number]     Acct: [de-identified]  Room: [facility] OR Pool/NONE    Admit Date: 4/19/2021  PCP: JUANITA Henson CNP    Physician Requesting Consult: Leticia Dempsey MD     Reason for Consult: Melena, hematemesis, hemoptysis. Chief Complaint:     Chief Complaint   Patient presents with    Hematemesis     upcoming scope    Rectal Bleeding     10 pounds weight loss, unable to sleep lying down       History Obtained From:     patient, electronic medical record    History of Present Illness:      Caryle Asters is a  77 y.o.  male who presents with Hematemesis (upcoming scope) and Rectal Bleeding (10 pounds weight loss, unable to sleep lying down)      Symptoms:  Patient [facility].    For about 2 to 3 months he was feeling sick. Had dyspepsia indigestion feeling. In the last 1 week he is having cough and spitting up blood. Looks like was having cough on and off for 3 months and in the last week getting worse. According to patient he does not have hematemesis. He did have some nausea however no significant emesis. He was having dark stools. According to medical record review it was mentioned that patient had hematemesis. Patient denies dysphagia. However he is having chronic GERD symptoms are not getting better with PPI therapy. Has not dyspeptic symptoms. Denies taking NSAIDs. He was all antiplatelet therapy. No prior history of known ulcer disease. This patient had lobectomy for carcinoma of the lung. Since then he gets short of breath with exertion. During this admission he had a CT scan of the abdomen done which was abnormal suggestive of questionable bowel obstruction, probable right kidney mass, also questionable mesenteric vascular abnormality. Patient denies symptoms of bowel obstruction. He does not have abdominal distention pain. He was eating regularly until yesterday.   Last bowel movement was Historical Provider, MD   pantoprazole (PROTONIX) 40 MG tablet TAKE 1 TABLET BY MOUTH EVERY DAY 1/8/19  Yes Historical Provider, MD   traMADol (ULTRAM) 50 MG tablet TAKE 1 TABLET BY MOUTH EVERY 6 HOURS AS NEEDED FOR PAIN 10/30/18  Yes Historical Provider, MD   guaiFENesin-codeine (TUSSI-ORGANIDIN NR) 100-10 MG/5ML syrup Take 5 mLs by mouth. . 1/2/19   Historical Provider, MD   glycopyrrolate-formoterol (Severiano Eaton) 9-4.8 MCG/ACT AERO Inhale 2 puffs into the lungs 5/3/18   Historical Provider, MD   Omega-3 Fatty Acids (FISH OIL PO) Take 2 g by mouth    Historical Provider, MD   cilostazol (PLETAL) 50 MG tablet Take 50 mg by mouth    Historical Provider, MD        Allergies:       Adhesive tape and Sulfa antibiotics    Social History:     Tobacco:    reports that he quit smoking about 3 years ago. He has never used smokeless tobacco.  Alcohol:      reports no history of alcohol use. Drug Use: reports no history of drug use. Family History:     Family History   Problem Relation Age of Onset    Stroke Mother     Cancer Father     Cancer Sister        Review of Systems:     Review of Systems   Constitutional: Negative for appetite change, fatigue and fever. HENT: Negative for mouth sores, sore throat, trouble swallowing and voice change. Eyes:        No ictirus   Respiratory: Positive for cough and shortness of breath. Negative for apnea, choking and wheezing. Hemoptysis    Cardiovascular: Negative for chest pain, palpitations and leg swelling. Endocrine: Negative for polydipsia, polyphagia and polyuria. Genitourinary: Negative for frequency, hematuria and urgency. Musculoskeletal: Negative for arthralgias, back pain, gait problem and joint swelling. Skin: Negative for pallor and rash. Allergic/Immunologic: Negative for food allergies. Neurological: Negative for dizziness, seizures, weakness and headaches. Hematological: Negative for adenopathy. Does not bruise/bleed easily. Psychiatric/Behavioral: Negative for sleep disturbance. The patient is not nervous/anxious. Code Status:  Full Code    Physical Exam:     Vitals:  BP (!) 114/59   Pulse 93   Temp 98.3 °F (36.8 °C) (Oral)   Resp 18   Ht 5' 8\" (1.727 m)   Wt 147 lb 3.2 oz (66.8 kg)   SpO2 96%   BMI 22.38 kg/m²   Temp (24hrs), Av.3 °F (36.8 °C), Min:98.2 °F (36.8 °C), Max:98.4 °F (36.9 °C)      Physical Exam  Vitals signs and nursing note reviewed. Constitutional:       General: He is not in acute distress. Appearance: He is well-developed. HENT:      Head: Normocephalic and atraumatic. Mouth/Throat:      Pharynx: No oropharyngeal exudate. Eyes:      General: No scleral icterus. Conjunctiva/sclera: Conjunctivae normal.      Pupils: Pupils are equal, round, and reactive to light. Neck:      Musculoskeletal: Normal range of motion and neck supple. Thyroid: No thyromegaly. Trachea: No tracheal deviation. Cardiovascular:      Rate and Rhythm: Normal rate and regular rhythm. Heart sounds: Normal heart sounds. No murmur. Pulmonary:      Effort: Pulmonary effort is normal. No respiratory distress. Breath sounds: Normal breath sounds. No wheezing or rales. Comments: Few basilar rales  Abdominal:      General: Bowel sounds are normal. There is no distension. Palpations: Abdomen is soft. There is no hepatomegaly or mass. Tenderness: There is no abdominal tenderness. There is no guarding. Hernia: No hernia is present. Comments: No peripheral signs of ch. Liver disease   Genitourinary:     Rectum: Normal.   Lymphadenopathy:      Cervical: No cervical adenopathy. Skin:     General: Skin is warm and dry. Findings: No erythema or rash. Neurological:      Mental Status: He is alert and oriented to person, place, and time. Cranial Nerves: No cranial nerve deficit. Psychiatric:         Thought Content:  Thought content normal.       Data:   CBC: Lab Results   Component Value Date    WBC 9.6 04/20/2021    RBC 3.93 04/20/2021    HGB 9.5 04/20/2021    HCT 31.9 04/20/2021    MCV 75.8 04/20/2021    MCH 23.2 04/20/2021    MCHC 30.5 04/20/2021    RDW 15.5 04/20/2021     04/20/2021    MPV 8.0 04/20/2021     CBC with Differential:  Lab Results   Component Value Date    WBC 9.6 04/20/2021    RBC 3.93 04/20/2021    HGB 9.5 04/20/2021    HCT 31.9 04/20/2021     04/20/2021    MCV 75.8 04/20/2021    MCH 23.2 04/20/2021    MCHC 30.5 04/20/2021    RDW 15.5 04/20/2021    LYMPHOPCT 9 04/20/2021    MONOPCT 9 04/20/2021    BASOPCT 0 04/20/2021    MONOSABS 0.87 04/20/2021    LYMPHSABS 0.87 04/20/2021    EOSABS 0.08 04/20/2021    BASOSABS 0.04 04/20/2021    DIFFTYPE NOT REPORTED 04/20/2021     Hemoglobin/Hematocrit:  Lab Results   Component Value Date    HGB 9.5 04/20/2021    HCT 31.9 04/20/2021     CMP:    Lab Results   Component Value Date     04/20/2021    K 4.2 04/20/2021    CL 98 04/20/2021    CO2 22 04/20/2021    BUN 8 04/20/2021    CREATININE 0.62 04/20/2021    GFRAA >60 04/20/2021    LABGLOM >60 04/20/2021    GLUCOSE 110 04/20/2021    PROT 7.0 04/19/2021    LABALBU 3.8 04/19/2021    CALCIUM 8.7 04/20/2021    BILITOT 0.21 04/19/2021    ALKPHOS 82 04/19/2021    AST 13 04/19/2021    ALT 12 04/19/2021     BMP:  Lab Results   Component Value Date     04/20/2021    K 4.2 04/20/2021    CL 98 04/20/2021    CO2 22 04/20/2021    BUN 8 04/20/2021    LABALBU 3.8 04/19/2021    CREATININE 0.62 04/20/2021    CALCIUM 8.7 04/20/2021    GFRAA >60 04/20/2021    LABGLOM >60 04/20/2021    GLUCOSE 110 04/20/2021     PT/INR:  No results found for: PROTIME, INR  PTT:  No results found for: APTT, PTT[APTT}    Assesment:     Primary Problem  GI bleed    Active Hospital Problems    Diagnosis Date Noted    GI bleed [K92.2] 04/19/2021    Chronic GERD [K21.9] 04/19/2021    [facility] [J44.9] 04/19/2021    Microcytic anemia [D50.9] 04/19/2021    Hyponatremia [E87.1] 04/19/2021    Kidney mass [N28.89]     Primary malignant neoplasm of left lower lobe of lung ([facility] [C34.32] 01/21/2019       Plan:     1.   2. Patient symptoms of chronic GERD dark stools, need to evaluate upper GI pathology. 3. He clearly stated that he had hemoptysis. 4. Discussed regarding EGD and he wants to have it done as this symptoms of GERD not getting better. 5. Discussed with the patient regarding EGD procedure risks and benefits and that this will be arranged. Thank you for allowing me to participate in the care of your patient. Please feel free to contact me with anyquestions or concerns. Electronically signed by Damian Eason MD on 4/20/2021 at 3:21 PM     Copy sent to JUANITA Galvez - CNP    Note is dictated utilizing voice recognition software. Unfortunately this leads to occasional typographical errors. Please contact our office if you have any questions.

## 2021-04-20 NOTE — DISCHARGE SUMMARY
[facility]  Office: [address] Bee Stuart Blood DO, Racheal Knapp MD, Yokasta Camara MD, Leticia Dempsey MD, Tabatha Banegas MD, Ann Horvath MD, Bina Maria MD, Ron Funez MD, Eda Shankar MD, Jered Michelle MD, Robert Delgado DO, Lida Jimenez MD, Mayank Montanez MD, Cayetano Plata DO, Willy Trevino MD,  Page Daugherty DO, Joseph Arce MD, Letitia Ashley MD, Shilpa Melvin [facility], [facility] CNP, Alex Thakur CNP, Samia Mahoney CNS, Ty Anderson CNP, Chase Smith CNP, Dejah Morales CNP, Amina Wisdom CNP, Janie Arenas CNP, Harsha Jenkins PA-C, Harper Barber CNP, Raven Paris CNP, Chantal Maya CNP, Redd Parish CNP, Alicja Hernandez CNP, Andi Thompson 126      Discharge Summary     Patient ID: Caryle Asters  :  1955   MRN: [id number]     ACCOUNT:  [de-identified]   [facility]  Patient's PCP: JUANITA Henson CNP  Admit Date: 2021   Discharge Date: 2021     Length of Stay: 1  Code Status:  Full Code  Admitting Physician: Luis Alfredo Starks DO  Discharge Physician: Leticia Dempsey MD     Active Discharge Diagnosis :     Primary Problem  GI bleed      Hospital Problems  Active Hospital Problems    Diagnosis Date Noted    [facility] [I73.9] 2021    GI bleed [K92.2] 2021    Chronic GERD [K21.9] 2021    COPD without exacerbation ([facility] [J44.9] 2021    Microcytic anemia [D50.9] 2021    Hyponatremia [E87.1] 2021    Kidney mass [N28.89]     Primary malignant neoplasm of left lower lobe of lung ([facility] [C34.32] 2019       Admission Condition:  fair     Discharged Condition: stable    Hospital Stay:     Hospital Course:  Caryle Asters is a 77 y.o. male who was admitted for the management of   GI bleed , presented to ER with Hematemesis (upcoming scope) and Rectal Bleeding (10 pounds weight loss, unable to sleep lying down)    Patient came to hospital with rectal bleeding, weight loss. He had been complaining of epigastric abdominal pain, worsening reflux symptoms. Patient also endorsed change in voice over the last 1 month. He also reported unintentional weight loss of 10 pounds. Patient reported melena and intermittent hematemesis. 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 CT abdomen pelvis with contrast showed distended stomach, possible SBO, suspicious solid mass in the mid upper pole of right kidney 3 x 3.2 cm x 2 cm suspicious for renal neoplasm. Patient went EGD however no source of bleeding was identified. Patient had change suggestive of Smith's esophagus. Significant therapeutic interventions:   1. Upper GI bleed-on Protonix, treated with Protonix. Underwent EGD and was found Smith's esophagus without any acute source of bleeding identified. Continue Protonix oral twice daily with Pepcid. Follow-up with primary gastroenterologist Dr. Freddy Castillo for scheduled colonoscopy on 5/5/2021. .  2. COPD-stable  3. Anemia secondary to chronic blood loss-continue iron supplement  4. Hyponatremia improved with IV fluid. 5. Right kidney mass -suspicious for renal neoplasm -outpatient urology follow-up.   6. H/o non-small cell left lower lobe lung cancer s/p lobectomy, adjuvant chemo and radiation therapy in 2018    Significant Diagnostic Studies:   Labs / Micro:/Radiology  Recent Labs     04/20/21  1156 04/20/21  0558 04/20/21  0033 04/19/21  1443   WBC  --  9.6  --  11.8*   HGB 9.5* 9.1* 9.5* 9.8*   HCT 31.9* 29.8* 31.2* 32.8*   MCV  --  75.8*  --  75.4*   PLT  --  322  --  404     Labs Renal Latest Ref Rng & Units 4/20/2021 4/19/2021   BUN 8 - 23 mg/dL 8 13   Cr 0.70 - 1.20 mg/dL 0.62(L) 0.67(L)   K 3.7 - 5.3 mmol/L 4.2 4.2   Na 135 - 144 mmol/L 133(L) 128(L)     Lab Results   Component Value Date    ALT 12 04/19/2021    AST 13 04/19/2021    ALKPHOS 82 04/19/2021    BILITOT 0.21 (L) 04/19/2021     No results found for: TSHFT4, TSH  No results found for: HAV, HEPAIGM, HEPBIGM, HEPBCAB, HBEAG, HEPCAB  No results found for: VOL, APPEARANCE, COLORU, LABSPEC, LABPH, LEUKBLD, NITRU, GLUCOSEU, KETUA, UROBILINOGEN, KETUA, UROBILINOGEN, BILIRUBINUR, OCBU  No results found for: LABA1C  No results found for: EAG  No results found for: INR, PROTIME    Ct Abdomen Pelvis W Iv Contrast Additional Contrast? None    Result Date: 4/19/2021  Distended stomach and proximal small bowel with decompressed distal small bowel. There is the appearance of a mesenteric vascular swirl. The possibility of a small bowel obstruction is suggested. The zone of transition not adequately identified on this study. No free fluid. No mesenteric edema noted. No bowel wall thickening noted no free air. Suspicious solid mass in the mid upper pole of the right kidney measuring 3 cm AP x 3.2 cm transverse by 2 cm craniocaudal.  Findings are suspicious for renal neoplasm     Ct Chest Pulmonary Embolism W Contrast    Result Date: 4/19/2021  Stable appearance of lungs compared to prior study. Persistent cystic collection left lung base postoperative changes left lung possible bronchopleural cannot be excluded small air-fluid level in cystic collection posteriorly left. Interval development of apical fibrosis in from prior study. ,    Consultations:    Consults:     Final Specialist Recommendations/Findings:   IP CONSULT TO HOSPITALIST  IP CONSULT TO GI      The patient was seen and examined on day of discharge and this discharge summary is in conjunction with any daily progress note from day of discharge.     Discharge plan:     Disposition: Home    Physician Follow Up:     Santiago Champion MD  [address] [facility] [address] [facility]]  [phone]      hospital follow up and for colonoscopy    Natasha Blum MD  [facility] [address] [place] [address] 3  [address]  [phone]      Further evaluation of right kidney mass    Evaristo Frias [address]  [phone]    In 1 week         Requiring Further Evaluation/Follow Up POST HOSPITALIZATION/Incidental Findings: follow up primary gastroenterologist Dr. Garrick Rodriguez for scheduled colonoscopy. Continue Protonix, iron supplement. Return if symptoms worsen. Follow-up with urology for right renal mass  Diet: cardiac diet    Activity: As tolerated. Instructions to Patient: Medication as advised    Discharge Medications:      Medication List      CHANGE how you take these medications    albuterol (2.5 MG/3ML) 0.083% nebulizer solution  Commonly known as: PROVENTIL  What changed: Another medication with the same name was removed. Continue taking this medication, and follow the directions you see here. traMADol 50 MG tablet  Commonly known as: ULTRAM  What changed: Another medication with the same name was removed. Continue taking this medication, and follow the directions you see here.         CONTINUE taking these medications    acetaminophen 500 MG tablet  Commonly known as: TYLENOL     Anoro Ellipta 62.5-25 MCG/INH Aepb inhaler  Generic drug: umeclidinium-vilanterol     cetirizine 10 MG chewable tablet  Commonly known as: ZYRTEC     clopidogrel 75 MG tablet  Commonly known as: PLAVIX     famotidine 40 MG tablet  Commonly known as: PEPCID     ferrous sulfate 325 (65 Fe) MG tablet  Commonly known as: IRON 325     fluticasone 50 MCG/ACT nasal spray  Commonly known as: FLONASE     guaiFENesin 400 MG tablet     hydroxychloroquine 200 MG tablet  Commonly known as: PLAQUENIL     MULTIVITAL PO     pantoprazole 40 MG tablet  Commonly known as: PROTONIX  Take 1 tablet by mouth 2 times daily (before meals)     VITAMIN B COMPLEX-C PO        STOP taking these medications    ascorbic acid 250 MG tablet  Commonly known as: VITAMIN C     Bevespi Aerosphere 9-4.8 MCG/ACT Aero  Generic drug: glycopyrrolate-formoterol     Ferrous Fum-Iron Polysacch 162-115.2 MG Caps     FISH OIL PO     VITAMIN B COMPLEX PO           Where to Get Your Medications      These medications were sent to [facility] [place] [address]    Phone: [phone]   · pantoprazole 40 MG tablet         Time Spent on discharge is  34 mins in patient examination, evaluation, counseling as well as medication reconciliation, prescriptions for required medications, discharge plan and follow up. Electronically signed by   Veena Schaffer MD  4/20/2021        Thank you Dr. Beacher Gowers, APRN - CNP for the opportunity to be involved in this patient's care.

## 2021-04-20 NOTE — ANESTHESIA POSTPROCEDURE EVALUATION
Department of Anesthesiology  Postprocedure Note    Patient: Garett Torres  MRN: [id number]  YOB: 1955  Date of evaluation: 4/20/2021  Time:  4:08 PM     Procedure Summary     Date: 04/20/21 Room / Location: [place] [address] / [facility] - INPATIENT    Anesthesia Start: 6768 Anesthesia Stop: 3828    Procedure: EGD DIAGNOSTIC ONLY (N/A ) Diagnosis: Vern Joyce I BLEED)    Surgeons: Renetta Miller MD Responsible Provider: Zane Bowers MD    Anesthesia Type: MAC, general ASA Status: 3          Anesthesia Type: MAC, general    Anabell Phase I: Anabell Score: 10    Anabell Phase II:      Last vitals: Reviewed and per EMR flowsheets.        Anesthesia Post Evaluation    Complications: no

## 2021-04-21 ENCOUNTER — CARE COORDINATION (OUTPATIENT)
Dept: CASE MANAGEMENT | Age: 66
End: 2021-04-21

## 2021-04-21 NOTE — CARE COORDINATION
[facility] Transitions Initial Follow Up Call- 1st attempt COVID risk follow up call       Call within 2 business days of discharge: Yes    Patient: Caryle Asters Patient : 1955   MRN: [id number]  Reason for Admission: GI bleed- hematemesis, rectal bleed, 10# weight loss, kidney mass suspicious for renal neoplasm   Discharge Date: 21 RARS: Readmission Risk Score: 16      Last Discharge [address] [address]       Complaint Diagnosis Description Type Department Provider    21 Hematemesis; Rectal Bleeding Rectal bleeding . .. ED to Hosp-Admission (Discharged) (ADMITTED) BART Dempsey MD; Jolanta Blackman. .. Date/Time:  2021 12:19 PM  Attempted to reach patient by telephone. Call within 2 business days of discharge: Yes Left HIPPA compliant message requesting a return call. Will attempt to reach patient again. Follow Up  No future appointments.     Shayna Nunez RN

## 2021-04-21 NOTE — CARE COORDINATION
[facility] Transitions Initial Follow Up Call- COVID risk follow up call       Call within 2 business days of discharge: Yes    Patient: Korin Ordonez Patient : 1955   MRN: [id number]  Reason for Admission: GI bleed- hematemesis, rectal bleed, 10# weight loss, kidney mass suspicious for renal neoplasm   Discharge Date: 21 RARS: Readmission Risk Score: 16      Last Discharge [facility]       Complaint Diagnosis Description Type Department Provider    21 Hematemesis; Rectal Bleeding Rectal bleeding . .. ED to Hosp-Admission (Discharged) (ADMITTED) BART Marinelli MD; [facility]. .. Spoke with: Narinder     Patient returns call. States he is doing okay   Denies blood from rectum or mouth   States breathing is normal shortness of breath with activity. Confirms he uses nebulizer twice daily and uses Anoro Ellipta daily as ordered   Denies abdominal pain, N, V, states no BM, no fever/ chills  States reflux is still bothering him- he confirms taking pantoprazole as ordered. Challenges to be reviewed by the provider   Additional needs identified to be addressed with provider No  none             Method of communication with provider : none    Discussed COVID-19 related testing which was available at this time. Test results were negative. Patient informed of results, if available? Yes    Advance Care Planning:   Does patient have an Advance Directive:  decision maker updated. Was this a readmission? No  Patient stated reason for admission: GI bleed   Patients top risk factors for readmission: lack of knowledge about disease    Care Transition Nurse (CTN) contacted the patient by telephone to perform post hospital discharge assessment. Verified name and  with patient as identifiers. Provided introduction to self, and explanation of the CTN role.      CTN reviewed discharge instructions, medical action plan and red flags with patient who verbalized understanding. Patient given an opportunity to ask questions and does not have any further questions or concerns at this time. Were discharge instructions available to patient? Yes. Reviewed appropriate site of care based on symptoms and resources available to patient including: PCP, Specialist, When to call 911 and ctn. The patient agrees to contact the PCP office for questions related to their healthcare. Medication reconciliation was performed with patient, who verbalizes understanding of administration of home medications. Advised obtaining a 90-day supply of all daily and as-needed medications. Covid Risk Education    Patient has following risk factors of: COPD and emphysema . Education provided regarding infection prevention, and signs and symptoms of COVID-19 and when to seek medical attention with patient who verbalized understanding. Discussed exposure protocols and quarantine From CDC: Are you at higher risk for severe illness?   and given an opportunity for questions and concerns. The patient agrees to contact the COVID-19 hotline [phone] or PCP office for questions related to COVID-19. For more information on steps you can take to protect yourself, see CDC's How to Protect Yourself     Was patient discharged with a pulse oximeter? No     Patient/family/caregiver given information for [employer] and agrees to enroll no  Patient's preferred e-mail: declines  Patient's preferred phone number: declines    Discussed follow-up appointments. If no appointment was previously scheduled, appointment scheduling offered: Yes. Is follow up appointment scheduled within 7 days of discharge? Yes  Non-[facility] follow up appointment(s): PCP 4/26    Plan for follow-up call in 5-7 days based on severity of symptoms and risk factors. Plan for next call: symptom management-routine follow up  and follow up appointment-urology appt   CTN provided contact information for future needs.           Non-face-to-face services provided:  Scheduled appointment with PCP-confirms appt 4/26  Scheduled appointment with Specialist-confirms appt with GI as he has colonoscopy 5/6; states he will call to schedule with Dr Merlin Malin for follow up on kidney mass  Obtained and reviewed discharge summary and/or continuity of care documents  Assessment and support for treatment adherence and medication management-confirms changed meds. States he has been on protonix before at this dose (order is just a refill)     Care Transitions 24 Hour Call    Do you have any ongoing symptoms?: No  Do you have a copy of your discharge instructions?: Yes  Do you have all of your prescriptions and are they filled?: Yes  Have you been contacted by a [facility]?: No  Have you scheduled your follow up appointment?: Yes  Were you discharged with any Home Care or Post Acute Services: No  Do you feel like you have everything you need to keep you well at home?: Yes  Care Transitions Interventions         Follow Up  No future appointments.     Wes Napier RN

## 2021-04-29 ENCOUNTER — CARE COORDINATION (OUTPATIENT)
Dept: CASE MANAGEMENT | Age: 66
End: 2021-04-29

## (undated) DEVICE — GLOVE SURG 7 11.7IN BEAD CUF LIGHT BRN SENSICARE LTX FREE

## (undated) DEVICE — ADAPTER TBNG LUER STUB 15 GA INTMED

## (undated) DEVICE — CONMED DISPOSABLE GASTROINTESTINAL CYTOLOGY BRUSH, STRAIGHT HANDLE, 2.5 MM X 160 CM: Brand: CONMED

## (undated) DEVICE — JELLY,LUBE,STERILE,FLIP TOP,TUBE,2-OZ: Brand: MEDLINE

## (undated) DEVICE — BLOCK BITE 60FR RUBBER ADLT DENTAL

## (undated) DEVICE — CO2 CANNULA,SUPERSOFT, ADLT,7'O2,7'CO2: Brand: MEDLINE

## (undated) DEVICE — CUP MED 1OZ CLR POLYPR FEED GRAD W/O LID

## (undated) DEVICE — Device: Brand: DEFENDO VALVE AND CONNECTOR KIT

## (undated) DEVICE — BASIN EMSIS 700ML GRAPHITE PLAS KID SHP GRAD

## (undated) DEVICE — GAUZE,SPONGE,4"X4",16PLY,STRL,LF,10/TRAY: Brand: MEDLINE

## (undated) DEVICE — MEDICINE CUP, GRADUATED, STER: Brand: MEDLINE